# Patient Record
Sex: MALE | Race: WHITE | NOT HISPANIC OR LATINO
[De-identification: names, ages, dates, MRNs, and addresses within clinical notes are randomized per-mention and may not be internally consistent; named-entity substitution may affect disease eponyms.]

---

## 2019-05-16 PROBLEM — Z00.00 ENCOUNTER FOR PREVENTIVE HEALTH EXAMINATION: Status: ACTIVE | Noted: 2019-05-16

## 2019-05-29 ENCOUNTER — APPOINTMENT (OUTPATIENT)
Dept: UROLOGY | Facility: CLINIC | Age: 64
End: 2019-05-29
Payer: COMMERCIAL

## 2019-05-29 ENCOUNTER — TRANSCRIPTION ENCOUNTER (OUTPATIENT)
Age: 64
End: 2019-05-29

## 2019-05-29 VITALS
SYSTOLIC BLOOD PRESSURE: 141 MMHG | BODY MASS INDEX: 31.66 KG/M2 | HEART RATE: 90 BPM | DIASTOLIC BLOOD PRESSURE: 91 MMHG | WEIGHT: 197 LBS | HEIGHT: 66 IN

## 2019-05-29 PROCEDURE — 99204 OFFICE O/P NEW MOD 45 MIN: CPT

## 2019-05-29 NOTE — PHYSICAL EXAM
[General Appearance - Well Developed] : well developed [General Appearance - Well Nourished] : well nourished [Normal Appearance] : normal appearance [Well Groomed] : well groomed [General Appearance - In No Acute Distress] : no acute distress [Edema] : no peripheral edema [Respiration, Rhythm And Depth] : normal respiratory rhythm and effort [Exaggerated Use Of Accessory Muscles For Inspiration] : no accessory muscle use [Abdomen Soft] : soft [Abdomen Tenderness] : non-tender [Costovertebral Angle Tenderness] : no ~M costovertebral angle tenderness [Urethral Meatus] : meatus normal [Urinary Bladder Findings] : the bladder was normal on palpation [Scrotum] : the scrotum was normal [Testes Mass (___cm)] : there were no testicular masses [No Prostate Nodules] : no prostate nodules [Normal Station and Gait] : the gait and station were normal for the patient's age [] : no rash [No Focal Deficits] : no focal deficits [Mood] : the mood was normal [Oriented To Time, Place, And Person] : oriented to person, place, and time [Affect] : the affect was normal [No Palpable Adenopathy] : no palpable adenopathy [Not Anxious] : not anxious [FreeTextEntry1] : The prostate is small smooth and non concerning

## 2019-05-29 NOTE — ASSESSMENT
[FreeTextEntry1] : This is an initial visit to [facility] with a 64-year-old patient who has had some right-sided discomfort and this will blood and urine\par He has a history of staghorn calculus in his right kidney and has undergone ESWL procedures in the past\par He has no current urinary symptomatology\par I will get a uric acid level renew his allopurinol and because of his hematuria I reviewed his CAT scan which appears normal except for the large staghorn in his kidney stones about 2 years old\par I plan to get a cytology and fascia stones urine and if abnormal will follow up with a cystoscopy

## 2019-09-04 ENCOUNTER — APPOINTMENT (OUTPATIENT)
Dept: UROLOGY | Facility: CLINIC | Age: 64
End: 2019-09-04
Payer: COMMERCIAL

## 2019-09-04 VITALS
HEART RATE: 83 BPM | SYSTOLIC BLOOD PRESSURE: 143 MMHG | WEIGHT: 199 LBS | HEIGHT: 66 IN | BODY MASS INDEX: 31.98 KG/M2 | DIASTOLIC BLOOD PRESSURE: 86 MMHG

## 2019-09-04 DIAGNOSIS — F17.290 NICOTINE DEPENDENCE, OTHER TOBACCO PRODUCT, UNCOMPLICATED: ICD-10-CM

## 2019-09-04 PROCEDURE — 99213 OFFICE O/P EST LOW 20 MIN: CPT

## 2019-09-04 NOTE — ASSESSMENT
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

## 2019-09-04 NOTE — PHYSICAL EXAM
[General Appearance - Well Developed] : well developed [General Appearance - Well Nourished] : well nourished [Normal Appearance] : normal appearance [Well Groomed] : well groomed [General Appearance - In No Acute Distress] : no acute distress [Abdomen Soft] : soft [Abdomen Tenderness] : non-tender [Urethral Meatus] : meatus normal [Costovertebral Angle Tenderness] : no ~M costovertebral angle tenderness [Urinary Bladder Findings] : the bladder was normal on palpation [Testes Mass (___cm)] : there were no testicular masses [Scrotum] : the scrotum was normal [No Prostate Nodules] : no prostate nodules [Edema] : no peripheral edema [] : no respiratory distress [Respiration, Rhythm And Depth] : normal respiratory rhythm and effort [Exaggerated Use Of Accessory Muscles For Inspiration] : no accessory muscle use [Oriented To Time, Place, And Person] : oriented to person, place, and time [Affect] : the affect was normal [Mood] : the mood was normal [Not Anxious] : not anxious [Normal Station and Gait] : the gait and station were normal for the patient's age [No Focal Deficits] : no focal deficits [No Palpable Adenopathy] : no palpable adenopathy

## 2020-05-27 ENCOUNTER — RX RENEWAL (OUTPATIENT)
Age: 65
End: 2020-05-27

## 2020-09-10 ENCOUNTER — RX RENEWAL (OUTPATIENT)
Age: 65
End: 2020-09-10

## 2021-03-05 ENCOUNTER — RX RENEWAL (OUTPATIENT)
Age: 66
End: 2021-03-05

## 2021-04-02 ENCOUNTER — RX RENEWAL (OUTPATIENT)
Age: 66
End: 2021-04-02

## 2021-04-12 DIAGNOSIS — R82.998 OTHER ABNORMAL FINDINGS IN URINE: ICD-10-CM

## 2021-04-12 DIAGNOSIS — Z83.3 FAMILY HISTORY OF DIABETES MELLITUS: ICD-10-CM

## 2021-04-12 DIAGNOSIS — Z83.438 FAMILY HISTORY OF OTHER DISORDER OF LIPOPROTEIN METABOLISM AND OTHER LIPIDEMIA: ICD-10-CM

## 2021-04-12 DIAGNOSIS — Z80.9 FAMILY HISTORY OF MALIGNANT NEOPLASM, UNSPECIFIED: ICD-10-CM

## 2021-04-12 DIAGNOSIS — R10.30 LOWER ABDOMINAL PAIN, UNSPECIFIED: ICD-10-CM

## 2021-04-12 DIAGNOSIS — Z82.49 FAMILY HISTORY OF ISCHEMIC HEART DISEASE AND OTHER DISEASES OF THE CIRCULATORY SYSTEM: ICD-10-CM

## 2021-04-14 ENCOUNTER — APPOINTMENT (OUTPATIENT)
Dept: UROLOGY | Facility: CLINIC | Age: 66
End: 2021-04-14
Payer: COMMERCIAL

## 2021-04-14 VITALS
WEIGHT: 195 LBS | HEART RATE: 83 BPM | TEMPERATURE: 98 F | BODY MASS INDEX: 31.34 KG/M2 | HEIGHT: 66 IN | SYSTOLIC BLOOD PRESSURE: 165 MMHG | DIASTOLIC BLOOD PRESSURE: 92 MMHG

## 2021-04-14 PROCEDURE — 99214 OFFICE O/P EST MOD 30 MIN: CPT

## 2021-04-14 PROCEDURE — 51798 US URINE CAPACITY MEASURE: CPT

## 2021-04-14 PROCEDURE — 36415 COLL VENOUS BLD VENIPUNCTURE: CPT

## 2021-04-14 PROCEDURE — 99072 ADDL SUPL MATRL&STAF TM PHE: CPT

## 2021-04-14 PROCEDURE — 76775 US EXAM ABDO BACK WALL LIM: CPT

## 2021-04-14 RX ORDER — PRAVASTATIN SODIUM 10 MG/1
10 TABLET ORAL
Refills: 0 | Status: ACTIVE | COMMUNITY

## 2021-04-14 RX ORDER — CHROMIUM 200 MCG
TABLET ORAL
Refills: 0 | Status: ACTIVE | COMMUNITY

## 2021-04-14 RX ORDER — ALLOPURINOL 300 MG/1
300 TABLET ORAL
Qty: 90 | Refills: 3 | Status: DISCONTINUED | COMMUNITY
Start: 2019-05-29 | End: 2021-04-14

## 2021-04-14 RX ORDER — OMEGA-3/DHA/EPA/FISH OIL 300-1000MG
CAPSULE ORAL
Refills: 0 | Status: ACTIVE | COMMUNITY

## 2021-04-15 LAB
ALBUMIN SERPL ELPH-MCNC: 4.8 G/DL
ALP BLD-CCNC: 57 U/L
ALT SERPL-CCNC: 22 U/L
ANION GAP SERPL CALC-SCNC: 13 MMOL/L
APPEARANCE: CLEAR
AST SERPL-CCNC: 22 U/L
BACTERIA: ABNORMAL
BILIRUB SERPL-MCNC: 0.3 MG/DL
BILIRUBIN URINE: NEGATIVE
BLOOD URINE: NORMAL
BUN SERPL-MCNC: 15 MG/DL
CALCIUM SERPL-MCNC: 9.7 MG/DL
CHLORIDE SERPL-SCNC: 104 MMOL/L
CO2 SERPL-SCNC: 24 MMOL/L
COLOR: YELLOW
CREAT SERPL-MCNC: 0.77 MG/DL
GLUCOSE QUALITATIVE U: ABNORMAL
GLUCOSE SERPL-MCNC: 132 MG/DL
HYALINE CASTS: 1 /LPF
KETONES URINE: NEGATIVE
LEUKOCYTE ESTERASE URINE: ABNORMAL
MICROSCOPIC-UA: NORMAL
NITRITE URINE: NEGATIVE
PH URINE: 6
POTASSIUM SERPL-SCNC: 4.6 MMOL/L
PROT SERPL-MCNC: 6.9 G/DL
PROTEIN URINE: NORMAL
PSA SERPL-MCNC: 1.47 NG/ML
RED BLOOD CELLS URINE: 5 /HPF
SODIUM SERPL-SCNC: 141 MMOL/L
SPECIFIC GRAVITY URINE: 1.02
SQUAMOUS EPITHELIAL CELLS: 0 /HPF
URATE SERPL-MCNC: 3.4 MG/DL
UROBILINOGEN URINE: NORMAL
WHITE BLOOD CELLS URINE: 110 /HPF

## 2021-05-10 ENCOUNTER — RX RENEWAL (OUTPATIENT)
Age: 66
End: 2021-05-10

## 2021-05-26 ENCOUNTER — APPOINTMENT (OUTPATIENT)
Dept: UROLOGY | Facility: CLINIC | Age: 66
End: 2021-05-26
Payer: COMMERCIAL

## 2021-05-26 VITALS
BODY MASS INDEX: 31.34 KG/M2 | DIASTOLIC BLOOD PRESSURE: 89 MMHG | HEIGHT: 66 IN | SYSTOLIC BLOOD PRESSURE: 153 MMHG | WEIGHT: 195 LBS | TEMPERATURE: 98 F | HEART RATE: 102 BPM

## 2021-05-26 PROCEDURE — 36415 COLL VENOUS BLD VENIPUNCTURE: CPT

## 2021-05-26 PROCEDURE — 99214 OFFICE O/P EST MOD 30 MIN: CPT

## 2021-05-26 PROCEDURE — 99072 ADDL SUPL MATRL&STAF TM PHE: CPT

## 2021-05-26 PROCEDURE — 51798 US URINE CAPACITY MEASURE: CPT

## 2021-05-26 NOTE — ADDENDUM
[FreeTextEntry1] : This is a followup visit for this 65-year-old patient who has BPH and incomplete emptying he is placed on Flomax and feels he is doing much better\par His post void residual urine his last visit was over 600 cc and today is only to 40\par He does have increased frequency or term attributing to his yet to be diagnosed diabetes\par Every time I see him he spells have a glucose urine\par On Flomax he is doing much better but has retrograde ejaculation and told him this is normal\par I was able to find a stone analysis from his prior procedure and the stone was 80% uric acid and 20% calcium\par Today I drew an A1c in anticipation of the seeing his primary care physician\par I discussed the medical carpus and the patient who is a diabetic including erectile dysfunction neurogenic bladder etc

## 2021-05-26 NOTE — PHYSICAL EXAM
[General Appearance - Well Developed] : well developed [General Appearance - Well Nourished] : well nourished [Normal Appearance] : normal appearance [Well Groomed] : well groomed [General Appearance - In No Acute Distress] : no acute distress [Abdomen Soft] : soft [Abdomen Tenderness] : non-tender [Costovertebral Angle Tenderness] : no ~M costovertebral angle tenderness [Urethral Meatus] : meatus normal [Urinary Bladder Findings] : the bladder was normal on palpation [Scrotum] : the scrotum was normal [Testes Mass (___cm)] : there were no testicular masses [No Prostate Nodules] : no prostate nodules [Edema] : no peripheral edema [] : no respiratory distress [Respiration, Rhythm And Depth] : normal respiratory rhythm and effort [Exaggerated Use Of Accessory Muscles For Inspiration] : no accessory muscle use [Oriented To Time, Place, And Person] : oriented to person, place, and time [Affect] : the affect was normal [Not Anxious] : not anxious [Mood] : the mood was normal [Normal Station and Gait] : the gait and station were normal for the patient's age [No Focal Deficits] : no focal deficits [No Palpable Adenopathy] : no palpable adenopathy [FreeTextEntry1] : The bladder is palpable to the umbilicus there is no CVA tenderness the prostate is small soft and nontender on JOSEPH

## 2021-05-26 NOTE — PHYSICAL EXAM
[General Appearance - Well Developed] : well developed [General Appearance - Well Nourished] : well nourished [Normal Appearance] : normal appearance [Well Groomed] : well groomed [General Appearance - In No Acute Distress] : no acute distress [Abdomen Soft] : soft [Abdomen Tenderness] : non-tender [Costovertebral Angle Tenderness] : no ~M costovertebral angle tenderness [Urethral Meatus] : meatus normal [Urinary Bladder Findings] : the bladder was normal on palpation [Scrotum] : the scrotum was normal [Testes Mass (___cm)] : there were no testicular masses [No Prostate Nodules] : no prostate nodules [FreeTextEntry1] : Prostate feels benignly enlarged [Edema] : no peripheral edema [] : no respiratory distress [Respiration, Rhythm And Depth] : normal respiratory rhythm and effort [Exaggerated Use Of Accessory Muscles For Inspiration] : no accessory muscle use [Oriented To Time, Place, And Person] : oriented to person, place, and time [Affect] : the affect was normal [Mood] : the mood was normal [Not Anxious] : not anxious [Normal Station and Gait] : the gait and station were normal for the patient's age [No Focal Deficits] : no focal deficits [No Palpable Adenopathy] : no palpable adenopathy

## 2021-05-26 NOTE — ASSESSMENT
[FreeTextEntry1] : Scissor routine followup for the 65-year-old patient with history of uric acid calculi and a staghorn in his right kidney is here for removal of his allopurinol\par On review PSAs have been fine\par Residual rectal exam is noncontributory\par Because the patient noted some slowing down of his stream a bladder ultrasound was done showing a post void over 600 cc\par Images of the kidney showed no hydronephrosis either side the large stone in the right kidney with a 4.54 cm cyst of the lower pole of the right kidney\par No hydronephrosis seen in the kidney with a staghorn stone of the lower pole of kidney is suspected\par Surprisingly the patient is spilling heavy glucose. But has no history of diabetes\par I will send for his old records to make certain that the staghorn is Made of uric acid because if it is chemolysis With dissolution of the stone is a good possibility\par The patient Was asked tor double void and He voided an additional 125 mL

## 2021-05-26 NOTE — ASSESSMENT
[FreeTextEntry1] : This is a followup visit for this 65-year-old patient who has BPH and incomplete emptying he is placed on Flomax and feels he is doing much better\par His post void residual urine his last visit was over 600 cc and today is only 240\par He does have increased frequency which i am  attributing to his yet to be diagnosed diabetes\par Every time I see him he spills a lot of  glucose in his urine\par On Flomax he is doing much better but has retrograde ejaculation and told him this is normal\par I was able to find a stone analysis from his prior procedure and the stone was 80% uric acid and 20% calcium\par Today I drew an A1c in anticipation of the seeing his primary care physician\par I discussed the medical consequences in a  patient who is a diabetic including erectile dysfunction neurogenic bladder etc\par I advised him once again to seek medical and endocrine consults in regard to his diabetes and possible chemolysis for his staghorn calculus

## 2021-05-26 NOTE — ADDENDUM
[FreeTextEntry1] : PVR\par A transabdominal ultrasound was performed images the bladder were obtained in the transverse and longitudinal post void residual was surprisingly over 600 cc the patient was asked to void again\par Bilateral renal ultrasound was done a 4.5 cm distal lower pole right kidney was seen multiple stones within the right kidney were noted compatible with a staghorn calculus\par The left kidney was normal in size and contour there is a suspicion of an acoustic shadowing stone at the lower pole collecting

## 2021-05-27 ENCOUNTER — NON-APPOINTMENT (OUTPATIENT)
Age: 66
End: 2021-05-27

## 2021-05-27 LAB
ALBUMIN SERPL ELPH-MCNC: 4.6 G/DL
ALP BLD-CCNC: 59 U/L
ALT SERPL-CCNC: 18 U/L
ANION GAP SERPL CALC-SCNC: 13 MMOL/L
APPEARANCE: ABNORMAL
AST SERPL-CCNC: 20 U/L
BACTERIA: ABNORMAL
BILIRUB SERPL-MCNC: 0.5 MG/DL
BILIRUBIN URINE: NEGATIVE
BLOOD URINE: ABNORMAL
BUN SERPL-MCNC: 15 MG/DL
CALCIUM SERPL-MCNC: 9.5 MG/DL
CHLORIDE SERPL-SCNC: 105 MMOL/L
CO2 SERPL-SCNC: 22 MMOL/L
COLOR: YELLOW
CREAT SERPL-MCNC: 0.75 MG/DL
ESTIMATED AVERAGE GLUCOSE: 143 MG/DL
GLUCOSE QUALITATIVE U: ABNORMAL
GLUCOSE SERPL-MCNC: 175 MG/DL
HBA1C MFR BLD HPLC: 6.6 %
HYALINE CASTS: 1 /LPF
KETONES URINE: NEGATIVE
LEUKOCYTE ESTERASE URINE: ABNORMAL
MICROSCOPIC-UA: NORMAL
NITRITE URINE: NEGATIVE
PH URINE: 5.5
POTASSIUM SERPL-SCNC: 4.6 MMOL/L
PROT SERPL-MCNC: 6.9 G/DL
PROTEIN URINE: ABNORMAL
RED BLOOD CELLS URINE: 45 /HPF
SODIUM SERPL-SCNC: 140 MMOL/L
SPECIFIC GRAVITY URINE: 1.02
SQUAMOUS EPITHELIAL CELLS: 0 /HPF
URATE SERPL-MCNC: 3.6 MG/DL
UROBILINOGEN URINE: NORMAL
WHITE BLOOD CELLS URINE: 176 /HPF

## 2021-06-07 ENCOUNTER — RX RENEWAL (OUTPATIENT)
Age: 66
End: 2021-06-07

## 2021-07-06 ENCOUNTER — RX RENEWAL (OUTPATIENT)
Age: 66
End: 2021-07-06

## 2022-05-31 ENCOUNTER — RX RENEWAL (OUTPATIENT)
Age: 67
End: 2022-05-31

## 2022-07-08 ENCOUNTER — RX RENEWAL (OUTPATIENT)
Age: 67
End: 2022-07-08

## 2022-07-11 ENCOUNTER — RX RENEWAL (OUTPATIENT)
Age: 67
End: 2022-07-11

## 2022-07-21 ENCOUNTER — APPOINTMENT (OUTPATIENT)
Dept: UROLOGY | Facility: CLINIC | Age: 67
End: 2022-07-21

## 2022-07-21 VITALS
HEART RATE: 82 BPM | HEIGHT: 65 IN | WEIGHT: 196 LBS | SYSTOLIC BLOOD PRESSURE: 152 MMHG | BODY MASS INDEX: 32.65 KG/M2 | DIASTOLIC BLOOD PRESSURE: 95 MMHG

## 2022-07-21 DIAGNOSIS — R81 GLYCOSURIA: ICD-10-CM

## 2022-07-21 PROCEDURE — 76857 US EXAM PELVIC LIMITED: CPT

## 2022-07-21 PROCEDURE — 81000 URINALYSIS NONAUTO W/SCOPE: CPT

## 2022-07-21 PROCEDURE — 99215 OFFICE O/P EST HI 40 MIN: CPT

## 2022-07-22 ENCOUNTER — NON-APPOINTMENT (OUTPATIENT)
Age: 67
End: 2022-07-22

## 2022-07-22 LAB
APPEARANCE: ABNORMAL
BACTERIA: ABNORMAL
BILIRUBIN URINE: NEGATIVE
BLOOD URINE: ABNORMAL
COLOR: YELLOW
GLUCOSE QUALITATIVE U: ABNORMAL
HYALINE CASTS: 0 /LPF
KETONES URINE: NORMAL
LEUKOCYTE ESTERASE URINE: ABNORMAL
MICROSCOPIC-UA: NORMAL
NITRITE URINE: NEGATIVE
PH URINE: 5.5
PROTEIN URINE: ABNORMAL
PSA SERPL-MCNC: 1.08 NG/ML
RED BLOOD CELLS URINE: 54 /HPF
SPECIFIC GRAVITY URINE: 1.02
SQUAMOUS EPITHELIAL CELLS: 0 /HPF
URINE CYTOLOGY: NORMAL
UROBILINOGEN URINE: NORMAL
WHITE BLOOD CELLS URINE: 323 /HPF

## 2022-07-29 NOTE — ASSESSMENT
[FreeTextEntry1] : Patient is a 67 year male who presents for annual follow up.  He states his urgency and frequency has increased slightly over the last year and force of stream has decreased.  He did not go to PCP for evaluation of diabetes as instructed by Dr. Ramos last year and continues to spill sugar in his urine.   ml\par JOSEPH was normal \par UA shows blood, LE and glucose\par PSA 1.47 in 4/21\par \par A/P\par 1. glucosuria \par I again recommend in urgent terms the need to go to his PCP to get evaluated for diabetes.\par I told him that his elevated PVR may be a result of loss of bladder function due to diabetes.\par 2. hematuria\par 3. prostate cancer screening \par 4. BPH with obstruction\par CT Scan abd and pelvis , UA micro and cyotology \par Flomax 0.4 mg po qd\par >50 minutes spent in review of data and discussion with patient\par office in 1 month

## 2022-07-29 NOTE — PHYSICAL EXAM
[General Appearance - Well Developed] : well developed [Normal Appearance] : normal appearance [General Appearance - In No Acute Distress] : no acute distress [Abdomen Soft] : soft [Abdomen Hernia] : no hernia was discovered [Urethral Meatus] : meatus normal [Penis Abnormality] : normal circumcised penis [Scrotum] : the scrotum was normal [Epididymis] : the epididymides were normal [Testes Tenderness] : no tenderness of the testes [Testes Mass (___cm)] : there were no testicular masses [Anus Abnormality] : the anus and perineum were normal [Rectal Exam - Rectum] : digital rectal exam was normal [Prostate Tenderness] : the prostate was not tender [No Prostate Nodules] : no prostate nodules [Prostate Size ___ gm] : prostate size [unfilled] gm [Skin Color & Pigmentation] : normal skin color and pigmentation [Edema] : no peripheral edema [] : no respiratory distress [Oriented To Time, Place, And Person] : oriented to person, place, and time [Normal Station and Gait] : the gait and station were normal for the patient's age [Inguinal Lymph Nodes Enlarged Bilaterally] : inguinal

## 2022-08-05 ENCOUNTER — RX CHANGE (OUTPATIENT)
Age: 67
End: 2022-08-05

## 2022-08-09 ENCOUNTER — RX RENEWAL (OUTPATIENT)
Age: 67
End: 2022-08-09

## 2022-08-15 ENCOUNTER — APPOINTMENT (OUTPATIENT)
Dept: UROLOGY | Facility: CLINIC | Age: 67
End: 2022-08-15

## 2022-08-15 VITALS
BODY MASS INDEX: 32.95 KG/M2 | TEMPERATURE: 98 F | HEART RATE: 78 BPM | DIASTOLIC BLOOD PRESSURE: 87 MMHG | WEIGHT: 198 LBS | SYSTOLIC BLOOD PRESSURE: 142 MMHG

## 2022-08-15 PROCEDURE — 99213 OFFICE O/P EST LOW 20 MIN: CPT

## 2022-08-16 NOTE — ASSESSMENT
[FreeTextEntry1] : Patient is a 67 year male with hematuria and recurrent UTIs from large right staghorn renal calculus.  Repeat CT scan shows stone relatively stable/slightly larger.  No interval UTIs.  Also BPH on  Tamsulosin.\par Seeing primary doctor for DM.\par \par A/P\par 1. right staghorn renal calculus\par 2. DM\par 3. BPH\par discussed CT findings and discuss PCNL\par he has been considering procedure for years \par he will consider office 4 months \par

## 2022-08-16 NOTE — PHYSICAL EXAM
[General Appearance - Well Developed] : well developed [Normal Appearance] : normal appearance [Abdomen Soft] : soft [Costovertebral Angle Tenderness] : no ~M costovertebral angle tenderness [Skin Color & Pigmentation] : normal skin color and pigmentation [Edema] : no peripheral edema [] : no respiratory distress [Oriented To Time, Place, And Person] : oriented to person, place, and time [Normal Station and Gait] : the gait and station were normal for the patient's age

## 2023-08-04 ENCOUNTER — RX RENEWAL (OUTPATIENT)
Age: 68
End: 2023-08-04

## 2023-09-25 ENCOUNTER — RX RENEWAL (OUTPATIENT)
Age: 68
End: 2023-09-25

## 2023-09-27 ENCOUNTER — NON-APPOINTMENT (OUTPATIENT)
Age: 68
End: 2023-09-27

## 2023-10-30 ENCOUNTER — APPOINTMENT (OUTPATIENT)
Dept: UROLOGY | Facility: CLINIC | Age: 68
End: 2023-10-30
Payer: COMMERCIAL

## 2023-10-30 VITALS
DIASTOLIC BLOOD PRESSURE: 91 MMHG | HEART RATE: 91 BPM | WEIGHT: 198 LBS | HEIGHT: 65 IN | BODY MASS INDEX: 32.99 KG/M2 | SYSTOLIC BLOOD PRESSURE: 133 MMHG

## 2023-10-30 DIAGNOSIS — Z87.442 PERSONAL HISTORY OF URINARY CALCULI: ICD-10-CM

## 2023-10-30 DIAGNOSIS — R31.29 OTHER MICROSCOPIC HEMATURIA: ICD-10-CM

## 2023-10-30 PROCEDURE — 99214 OFFICE O/P EST MOD 30 MIN: CPT

## 2023-10-31 LAB — PSA SERPL-MCNC: 1.33 NG/ML

## 2023-11-03 RX ORDER — ALLOPURINOL 300 MG/1
300 TABLET ORAL
Qty: 90 | Refills: 3 | Status: ACTIVE | COMMUNITY
Start: 2021-04-14 | End: 1900-01-01

## 2023-11-30 ENCOUNTER — APPOINTMENT (OUTPATIENT)
Dept: UROLOGY | Facility: CLINIC | Age: 68
End: 2023-11-30

## 2023-12-20 ENCOUNTER — APPOINTMENT (OUTPATIENT)
Dept: UROLOGY | Facility: CLINIC | Age: 68
End: 2023-12-20
Payer: COMMERCIAL

## 2023-12-20 PROCEDURE — 99441: CPT | Mod: 93

## 2023-12-27 RX ORDER — TAMSULOSIN HYDROCHLORIDE 0.4 MG/1
0.4 CAPSULE ORAL
Qty: 90 | Refills: 3 | Status: ACTIVE | COMMUNITY
Start: 2021-04-14 | End: 1900-01-01

## 2024-03-12 ENCOUNTER — NON-APPOINTMENT (OUTPATIENT)
Age: 69
End: 2024-03-12

## 2024-03-20 ENCOUNTER — APPOINTMENT (OUTPATIENT)
Dept: PEDIATRIC ORTHOPEDIC SURGERY | Facility: CLINIC | Age: 69
End: 2024-03-20
Payer: COMMERCIAL

## 2024-03-20 VITALS — TEMPERATURE: 97.1 F | DIASTOLIC BLOOD PRESSURE: 85 MMHG | SYSTOLIC BLOOD PRESSURE: 144 MMHG

## 2024-03-20 VITALS — DIASTOLIC BLOOD PRESSURE: 85 MMHG | TEMPERATURE: 97 F | SYSTOLIC BLOOD PRESSURE: 167 MMHG

## 2024-03-20 PROCEDURE — 20610 DRAIN/INJ JOINT/BURSA W/O US: CPT | Mod: LT

## 2024-03-20 PROCEDURE — 73562 X-RAY EXAM OF KNEE 3: CPT | Mod: LT

## 2024-03-20 PROCEDURE — 99203 OFFICE O/P NEW LOW 30 MIN: CPT | Mod: 25

## 2024-03-20 RX ORDER — MELOXICAM 15 MG/1
15 TABLET ORAL
Qty: 30 | Refills: 1 | Status: ACTIVE | COMMUNITY
Start: 2024-03-20 | End: 1900-01-01

## 2024-03-20 NOTE — HISTORY OF PRESENT ILLNESS
[de-identified] : This 68-year-old healthy gentleman is seen for evaluation of his left knee.  He was well until approximately 10 days ago when he was getting into his car quickly and he struck the anterior aspect of his knee against the dashboard.  He was precipitated pain with minimal swelling and stiffness.  He did improve however 1 evening he played pickle ball and the following day he woke up with severe pain and soft tissue swelling to his knee.  This was associated with mild stiffness and difficulty bearing weight.  No locking buckling or sensation of instability.  Prior to this he had no history of knee complaint.  He has a past history of kidney stones for which she is on allopurinol.  He has never had a history of gouty attack

## 2024-03-20 NOTE — PHYSICAL EXAM
[de-identified] : Exam today reveals a minimally antalgic component to his gait on the left side he has full motion to the left hip and knee the knee slight restriction of full flexion of maybe 5 degrees as compared to the opposite side.  He has a moderate effusion there is no evidence of instability on stress of the cruciate/collateral ligaments.  He has obvious pain along the posterior medial joint line and over the femoral origin of the MCL currently does not again open up distress.  Mildly positive Steinmann.  No significant pain on patellofemoral grind and in the lateral compartment.  Popliteal fossa calf neuro vas exam are negative.  X-rays ordered and taken today standing to include a Hinson view of the left knee reveal no significant abnormalities

## 2024-03-31 ENCOUNTER — NON-APPOINTMENT (OUTPATIENT)
Age: 69
End: 2024-03-31

## 2024-04-02 ENCOUNTER — APPOINTMENT (OUTPATIENT)
Dept: UROLOGY | Facility: CLINIC | Age: 69
End: 2024-04-02
Payer: COMMERCIAL

## 2024-04-02 VITALS
WEIGHT: 186 LBS | SYSTOLIC BLOOD PRESSURE: 146 MMHG | DIASTOLIC BLOOD PRESSURE: 89 MMHG | HEART RATE: 104 BPM | BODY MASS INDEX: 30.99 KG/M2 | HEIGHT: 65 IN

## 2024-04-02 DIAGNOSIS — N40.0 BENIGN PROSTATIC HYPERPLASIA WITHOUT LOWER URINARY TRACT SYMPMS: ICD-10-CM

## 2024-04-02 DIAGNOSIS — N20.0 CALCULUS OF KIDNEY: ICD-10-CM

## 2024-04-02 PROCEDURE — 99214 OFFICE O/P EST MOD 30 MIN: CPT

## 2024-04-02 RX ORDER — SULFAMETHOXAZOLE AND TRIMETHOPRIM 800; 160 MG/1; MG/1
TABLET ORAL
Refills: 0 | Status: ACTIVE | COMMUNITY

## 2024-04-02 NOTE — ASSESSMENT
[FreeTextEntry1] : Patient is a 68-year-old male who presents to me with gross hematuria over the last 2 days.  He is increased his fluid intake and is starting to lighten up.  He went to urgent care and they started him on Bactrim 1 p.o. twice daily for days.  He denies any fevers, chills, nausea or vomiting.  He did have a CT scan in November 2023 that showed stable right staghorn with some additional stones and mild right hydronephrosis which is stable for the patient.  I have been discussing with him the need to treat this staghorn renal calculus over the past several years but he does wish to observe.  In addition, he has BPH with obstruction and continues on tamsulosin 0.4 mg p.o. daily with good results.  Assessment and plan 1.  Staghorn right renal calculus likely contributing to hematuria may be causing chronic inflammation of the right renal pelvis.  I discussed the my concerns that this could be resulting in worsening renal function or hydronephrosis. 2.  Gross hematuria 3.  BPH with obstruction on tamsulosin we will continue I will send his urine for cytology and get him set up for office cystoscopy.  Since he has had a recent CT scan in November 23 will hold off on imaging for right now.  I will see if his hematuria clears and see what his cytology shows.

## 2024-04-02 NOTE — PHYSICAL EXAM
[General Appearance - Well Developed] : well developed [General Appearance - In No Acute Distress] : no acute distress [Normal Appearance] : normal appearance [Respiration, Rhythm And Depth] : normal respiratory rhythm and effort [] : no rash [Abdomen Soft] : soft [Costovertebral Angle Tenderness] : no ~M costovertebral angle tenderness [Oriented To Time, Place, And Person] : oriented to person, place, and time

## 2024-04-04 LAB — URINE CYTOLOGY: NORMAL

## 2024-04-09 ENCOUNTER — APPOINTMENT (OUTPATIENT)
Dept: UROLOGY | Facility: CLINIC | Age: 69
End: 2024-04-09
Payer: COMMERCIAL

## 2024-04-09 VITALS
HEART RATE: 87 BPM | HEIGHT: 65 IN | BODY MASS INDEX: 30.99 KG/M2 | SYSTOLIC BLOOD PRESSURE: 135 MMHG | DIASTOLIC BLOOD PRESSURE: 84 MMHG | WEIGHT: 186 LBS

## 2024-04-09 PROCEDURE — 52000 CYSTOURETHROSCOPY: CPT

## 2024-04-09 RX ORDER — AMPICILLIN 500 MG/1
500 CAPSULE ORAL 4 TIMES DAILY
Qty: 24 | Refills: 0 | Status: DISCONTINUED | COMMUNITY
Start: 2021-06-01 | End: 2024-04-09

## 2024-05-09 ENCOUNTER — APPOINTMENT (OUTPATIENT)
Dept: PEDIATRIC ORTHOPEDIC SURGERY | Facility: CLINIC | Age: 69
End: 2024-05-09
Payer: COMMERCIAL

## 2024-05-09 VITALS
TEMPERATURE: 96.5 F | BODY MASS INDEX: 30.99 KG/M2 | WEIGHT: 186 LBS | DIASTOLIC BLOOD PRESSURE: 80 MMHG | HEIGHT: 65 IN | SYSTOLIC BLOOD PRESSURE: 130 MMHG

## 2024-05-09 DIAGNOSIS — M23.8X2 OTHER INTERNAL DERANGEMENTS OF LEFT KNEE: ICD-10-CM

## 2024-05-09 PROCEDURE — 99212 OFFICE O/P EST SF 10 MIN: CPT

## 2024-05-09 RX ORDER — DICLOFENAC SODIUM 75 MG/1
75 TABLET, DELAYED RELEASE ORAL TWICE DAILY
Qty: 60 | Refills: 1 | Status: ACTIVE | COMMUNITY
Start: 2024-05-09 | End: 1900-01-01

## 2024-05-09 NOTE — ASSESSMENT
[FreeTextEntry1] : Impression: Internal derangement left knee likely tear medial meniscus.  I have changed his medication from Mobic to diclofenac with GI precautions physical therapy prescription again has been provided.  I have also ordered an MRI exam the potential for arthroscopic surgery has been discussed

## 2024-05-09 NOTE — PHYSICAL EXAM
[de-identified] : Exam reveals an obvious antalgic gait on the left side he has moderate effusion no instability has obvious pain in the medial compartment posterior medial joint line with a positive Steinmann/Crissy.  Popliteal fossa calf neuro vas exam are negative

## 2024-05-09 NOTE — HISTORY OF PRESENT ILLNESS
[de-identified] : This 68-year-old returns he has not had any improvement whatsoever with regards to his left knee.  He has persistent limp and stiffness and difficulty with motion.  He feels as if his knee will give out on him.

## 2024-06-01 ENCOUNTER — RESULT REVIEW (OUTPATIENT)
Age: 69
End: 2024-06-01

## 2024-06-25 ENCOUNTER — APPOINTMENT (OUTPATIENT)
Dept: UROLOGY | Facility: CLINIC | Age: 69
End: 2024-06-25

## 2024-06-25 VITALS
SYSTOLIC BLOOD PRESSURE: 132 MMHG | HEART RATE: 94 BPM | DIASTOLIC BLOOD PRESSURE: 81 MMHG | BODY MASS INDEX: 30.99 KG/M2 | WEIGHT: 186 LBS | HEIGHT: 65 IN

## 2024-06-27 LAB — BACTERIA UR CULT: NORMAL

## 2024-06-28 ENCOUNTER — APPOINTMENT (OUTPATIENT)
Dept: UROLOGY | Facility: CLINIC | Age: 69
End: 2024-06-28

## 2024-06-28 ENCOUNTER — APPOINTMENT (OUTPATIENT)
Dept: UROLOGY | Facility: CLINIC | Age: 69
End: 2024-06-28
Payer: COMMERCIAL

## 2024-06-28 VITALS
BODY MASS INDEX: 30.99 KG/M2 | DIASTOLIC BLOOD PRESSURE: 76 MMHG | HEART RATE: 97 BPM | SYSTOLIC BLOOD PRESSURE: 145 MMHG | HEIGHT: 65 IN | WEIGHT: 186 LBS

## 2024-06-28 DIAGNOSIS — N30.20 OTHER CHRONIC CYSTITIS W/OUT HEMATURIA: ICD-10-CM

## 2024-06-28 DIAGNOSIS — N20.0 CALCULUS OF KIDNEY: ICD-10-CM

## 2024-06-28 DIAGNOSIS — N39.0 URINARY TRACT INFECTION, SITE NOT SPECIFIED: ICD-10-CM

## 2024-06-28 DIAGNOSIS — R31.9 HEMATURIA, UNSPECIFIED: ICD-10-CM

## 2024-06-28 PROCEDURE — 99214 OFFICE O/P EST MOD 30 MIN: CPT

## 2024-07-01 DIAGNOSIS — R97.20 ELEVATED PROSTATE, SPECIFIC ANTIGEN [PSA]: ICD-10-CM

## 2024-07-01 LAB — PSA SERPL-MCNC: 2.23 NG/ML

## 2024-07-03 ENCOUNTER — NON-APPOINTMENT (OUTPATIENT)
Age: 69
End: 2024-07-03

## 2024-07-08 ENCOUNTER — APPOINTMENT (OUTPATIENT)
Dept: UROLOGY | Facility: CLINIC | Age: 69
End: 2024-07-08
Payer: COMMERCIAL

## 2024-07-08 VITALS
HEIGHT: 65 IN | BODY MASS INDEX: 30.99 KG/M2 | HEART RATE: 108 BPM | SYSTOLIC BLOOD PRESSURE: 124 MMHG | DIASTOLIC BLOOD PRESSURE: 73 MMHG | WEIGHT: 186 LBS

## 2024-07-08 PROCEDURE — 99214 OFFICE O/P EST MOD 30 MIN: CPT

## 2024-07-08 RX ORDER — FINASTERIDE 5 MG/1
5 TABLET, FILM COATED ORAL DAILY
Qty: 90 | Refills: 3 | Status: ACTIVE | COMMUNITY
Start: 2024-07-08 | End: 1900-01-01

## 2024-07-10 ENCOUNTER — APPOINTMENT (OUTPATIENT)
Dept: UROLOGY | Facility: CLINIC | Age: 69
End: 2024-07-10
Payer: COMMERCIAL

## 2024-07-10 VITALS
HEIGHT: 65 IN | SYSTOLIC BLOOD PRESSURE: 108 MMHG | WEIGHT: 186 LBS | DIASTOLIC BLOOD PRESSURE: 68 MMHG | BODY MASS INDEX: 30.99 KG/M2 | HEART RATE: 106 BPM

## 2024-07-10 DIAGNOSIS — R31.9 HEMATURIA, UNSPECIFIED: ICD-10-CM

## 2024-07-10 PROCEDURE — 99213 OFFICE O/P EST LOW 20 MIN: CPT

## 2024-07-12 LAB
ALBUMIN SERPL ELPH-MCNC: 4.1 G/DL
ALP BLD-CCNC: 48 U/L
ALT SERPL-CCNC: 15 U/L
ANION GAP SERPL CALC-SCNC: 12 MMOL/L
AST SERPL-CCNC: 20 U/L
BILIRUB SERPL-MCNC: <0.2 MG/DL
BUN SERPL-MCNC: 18 MG/DL
CALCIUM SERPL-MCNC: 9.3 MG/DL
CHLORIDE SERPL-SCNC: 100 MMOL/L
CO2 SERPL-SCNC: 24 MMOL/L
CREAT SERPL-MCNC: 1.14 MG/DL
EGFR: 70 ML/MIN/1.73M2
GLUCOSE SERPL-MCNC: 226 MG/DL
HCT VFR BLD CALC: 29.4 %
HGB BLD-MCNC: 9.4 G/DL
MCHC RBC-ENTMCNC: 28.6 PG
MCHC RBC-ENTMCNC: 32 GM/DL
MCV RBC AUTO: 89.4 FL
PLATELET # BLD AUTO: 330 K/UL
POTASSIUM SERPL-SCNC: 4.8 MMOL/L
PROT SERPL-MCNC: 6.5 G/DL
RBC # BLD: 3.29 M/UL
RBC # FLD: 13.3 %
SODIUM SERPL-SCNC: 137 MMOL/L
WBC # FLD AUTO: 8.76 K/UL

## 2024-07-16 ENCOUNTER — TRANSCRIPTION ENCOUNTER (OUTPATIENT)
Age: 69
End: 2024-07-16

## 2024-07-19 ENCOUNTER — APPOINTMENT (OUTPATIENT)
Dept: UROLOGY | Facility: CLINIC | Age: 69
End: 2024-07-19

## 2024-07-19 VITALS
TEMPERATURE: 98.2 F | HEIGHT: 65 IN | BODY MASS INDEX: 30.82 KG/M2 | OXYGEN SATURATION: 99 % | HEART RATE: 90 BPM | DIASTOLIC BLOOD PRESSURE: 83 MMHG | SYSTOLIC BLOOD PRESSURE: 136 MMHG | WEIGHT: 185 LBS

## 2024-07-19 PROCEDURE — 99214 OFFICE O/P EST MOD 30 MIN: CPT

## 2024-07-22 LAB
APTT BLD: 31.2 SEC
INR PPP: 0.96 RATIO
PT BLD: 11 SEC

## 2024-07-23 ENCOUNTER — APPOINTMENT (OUTPATIENT)
Dept: UROLOGY | Facility: CLINIC | Age: 69
End: 2024-07-23
Payer: COMMERCIAL

## 2024-07-23 VITALS
HEIGHT: 65 IN | HEART RATE: 111 BPM | WEIGHT: 185 LBS | DIASTOLIC BLOOD PRESSURE: 71 MMHG | BODY MASS INDEX: 30.82 KG/M2 | SYSTOLIC BLOOD PRESSURE: 120 MMHG

## 2024-07-23 DIAGNOSIS — N40.0 BENIGN PROSTATIC HYPERPLASIA WITHOUT LOWER URINARY TRACT SYMPMS: ICD-10-CM

## 2024-07-23 DIAGNOSIS — N30.20 OTHER CHRONIC CYSTITIS W/OUT HEMATURIA: ICD-10-CM

## 2024-07-23 DIAGNOSIS — Z87.442 PERSONAL HISTORY OF URINARY CALCULI: ICD-10-CM

## 2024-07-23 DIAGNOSIS — N20.0 CALCULUS OF KIDNEY: ICD-10-CM

## 2024-07-23 DIAGNOSIS — N39.0 URINARY TRACT INFECTION, SITE NOT SPECIFIED: ICD-10-CM

## 2024-07-23 LAB
ANION GAP SERPL CALC-SCNC: 13 MMOL/L
BUN SERPL-MCNC: 23 MG/DL
CALCIUM SERPL-MCNC: 9.7 MG/DL
CHLORIDE SERPL-SCNC: 101 MMOL/L
CO2 SERPL-SCNC: 22 MMOL/L
CREAT SERPL-MCNC: 1.13 MG/DL
EGFR: 71 ML/MIN/1.73M2
GLUCOSE SERPL-MCNC: 158 MG/DL
HCT VFR BLD CALC: 37.3 %
HGB BLD-MCNC: 11.5 G/DL
MCHC RBC-ENTMCNC: 28.8 PG
MCHC RBC-ENTMCNC: 30.8 GM/DL
MCV RBC AUTO: 93.5 FL
PLATELET # BLD AUTO: 340 K/UL
POTASSIUM SERPL-SCNC: 5 MMOL/L
RBC # BLD: 3.99 M/UL
RBC # FLD: 14.4 %
SODIUM SERPL-SCNC: 136 MMOL/L
WBC # FLD AUTO: 7.82 K/UL

## 2024-07-23 PROCEDURE — 99213 OFFICE O/P EST LOW 20 MIN: CPT

## 2024-07-23 RX ORDER — CEFDINIR 300 MG/1
300 CAPSULE ORAL TWICE DAILY
Qty: 14 | Refills: 0 | Status: ACTIVE | COMMUNITY
Start: 2024-07-23 | End: 1900-01-01

## 2024-07-23 NOTE — ASSESSMENT
[FreeTextEntry1] : Patient is a 69-year-old male with intermittent gross hematuria, urinary retention, recent UTI and staghorn renal calculus. He is doing intermittent catheterization and voiding small amounts on his own. He states his urine is clearing up and he has had no interval fevers. He does feel fatigued with malodorous urine.  Assessment and plan 1. Staghorn right renal calculus likely contributing to hematuria may be causing chronic inflammation of the right renal pelvis. I discussed the my concerns again that this could be resulting in worsening renal function or hydronephrosis. He saw tertiary care specialist at [facility] to see for evaluation and treatment of his right staghorn and is scheduled for R PCNL in August. 2. Gross hematuria-resolved 3. BPH with obstruction/urinary retention currently on tamsulosin to 0.8 mg a day and finasteride 5 mg p.o. daily. Continue CIC and I will call in a week of Omnicef.  He will follow up with me after his surgery.

## 2024-07-23 NOTE — PHYSICAL EXAM
[General Appearance - Well Developed] : well developed [Normal Appearance] : normal appearance [Respiration, Rhythm And Depth] : normal respiratory rhythm and effort [] : no rash [Oriented To Time, Place, And Person] : oriented to person, place, and time [FreeTextEntry1] : appears fatigued

## 2024-08-13 LAB
APPEARANCE: ABNORMAL
BACTERIA: ABNORMAL /HPF
BILIRUBIN URINE: NEGATIVE
BLOOD URINE: ABNORMAL
CAST: 5 /LPF
COLOR: YELLOW
EPITHELIAL CELLS: 1 /HPF
GLUCOSE QUALITATIVE U: 500 MG/DL
KETONES URINE: NEGATIVE MG/DL
LEUKOCYTE ESTERASE URINE: ABNORMAL
MICROSCOPIC-UA: NORMAL
NITRITE URINE: POSITIVE
PH URINE: 5.5
PROTEIN URINE: 30 MG/DL
RED BLOOD CELLS URINE: 18 /HPF
REVIEW: NORMAL
SPECIFIC GRAVITY URINE: 1.02
UROBILINOGEN URINE: 0.2 MG/DL
WBC CLUMPS: PRESENT
WHITE BLOOD CELLS URINE: >998 /HPF

## 2024-08-13 RX ORDER — CEFDINIR 300 MG/1
300 CAPSULE ORAL TWICE DAILY
Qty: 20 | Refills: 0 | Status: ACTIVE | COMMUNITY
Start: 2024-08-13 | End: 1900-01-01

## 2024-08-15 ENCOUNTER — NON-APPOINTMENT (OUTPATIENT)
Age: 69
End: 2024-08-15

## 2024-08-16 VITALS
DIASTOLIC BLOOD PRESSURE: 82 MMHG | TEMPERATURE: 97 F | WEIGHT: 183.42 LBS | HEART RATE: 78 BPM | SYSTOLIC BLOOD PRESSURE: 144 MMHG | RESPIRATION RATE: 16 BRPM | HEIGHT: 65 IN | OXYGEN SATURATION: 100 %

## 2024-08-16 LAB — BACTERIA UR CULT: ABNORMAL

## 2024-08-16 NOTE — ASU PATIENT PROFILE, ADULT - NSICDXPASTMEDICALHX_GEN_ALL_CORE_FT
PAST MEDICAL HISTORY:  Calculus of kidney      PAST MEDICAL HISTORY:  Calculus of kidney     Recurrent UTI      PAST MEDICAL HISTORY:  Calculus of kidney     HLD (hyperlipidemia)     Recurrent UTI

## 2024-08-16 NOTE — ASU PREOP CHECKLIST - HEART RATE (BEATS/MIN)
[facility] Division of Hospital Medicine  Johny Guerra DO  Pager (M-F, 8A-5P): [phone]      Patient is a 58y old  Male who presents with a chief complaint of r/o COVID/hypoxia (22 Mar 2020 04:33)      SUBJECTIVE / OVERNIGHT EVENTS: Pt see in [facility]. Pt initially conversing with self and dameon, but did appropriately answer my question when prompted. Currently denies sob, reports being told of a fever, but does not feel warm and reports chronic le edema  ADDITIONAL REVIEW OF SYSTEMS: limited due to psych disorder     MEDICATIONS  (STANDING):  benztropine 0.5 milliGRAM(s) Oral two times a day  dextrose 5%. 1000 milliLiter(s) (50 mL/Hr) IV Continuous <Continuous>  dextrose 50% Injectable 12.5 Gram(s) IV Push once  dextrose 50% Injectable 25 Gram(s) IV Push once  dextrose 50% Injectable 25 Gram(s) IV Push once  diVALproex  milliGRAM(s) Oral two times a day  fluPHENAZine 5 milliGRAM(s) Oral two times a day  heparin  Injectable 5000 Unit(s) SubCutaneous every 12 hours  insulin lispro (HumaLOG) corrective regimen sliding scale   SubCutaneous three times a day before meals  insulin lispro (HumaLOG) corrective regimen sliding scale   SubCutaneous at bedtime  risperiDONE   Tablet 1 milliGRAM(s) Oral two times a day  tiotropium 18 MICROgram(s) Capsule 1 Capsule(s) Inhalation daily    MEDICATIONS  (PRN):  acetaminophen   Tablet .. 650 milliGRAM(s) Oral every 6 hours PRN Temp greater or equal to 38C (100.4F), Mild Pain (1 - 3), Moderate Pain (4 - 6), Severe Pain (7 - 10)  ALBUTerol    90 MICROgram(s) HFA Inhaler 2 Puff(s) Inhalation every 6 hours PRN Shortness of Breath and/or Wheezing  dextrose 40% Gel 15 Gram(s) Oral once PRN Blood Glucose LESS THAN 70 milliGRAM(s)/deciliter  glucagon  Injectable 1 milliGRAM(s) IntraMuscular once PRN Glucose LESS THAN 70 milligrams/deciliter      CAPILLARY BLOOD GLUCOSE      POCT Blood Glucose.: 95 mg/dL (22 Mar 2020 17:52)  POCT Blood Glucose.: 107 mg/dL (22 Mar 2020 12:53)  POCT Blood Glucose.: 96 mg/dL (22 Mar 2020 09:50)    I&O's Summary    22 Mar 2020 07:01  -  22 Mar 2020 20:19  --------------------------------------------------------  IN: 0 mL / OUT: 1000 mL / NET: -1000 mL        PHYSICAL EXAM:  Vital Signs Last 24 Hrs  T(C): 36.7 (22 Mar 2020 20:00), Max: 38 (22 Mar 2020 09:57)  T(F): 98 (22 Mar 2020 20:00), Max: 100.4 (22 Mar 2020 09:57)  HR: 120 (22 Mar 2020 20:00) (95 - 125)  BP: 115/87 (22 Mar 2020 20:00) (110/57 - 133/70)  BP(mean): --  RR: 20 (22 Mar 2020 20:00) (13 - 20)  SpO2: 96% (22 Mar 2020 20:00) (87% - 98%)  CONSTITUTIONAL: NAD, disheveled   EYES: EOMI; conjunctiva and sclera clear  ENMT: Moist oral mucosa, no pharyngeal injection or exudates; poor dentition  NECK: Supple,  RESPIRATORY: Normal respiratory effort; lungs are clear to auscultation bilaterally  CARDIOVASCULAR: tachycardic, regular rhythm, normal S1 and S2, no murmur/rub/gallop;  +b/l LE pitting edema, w/chronic venous stasis changes and areas of skin ulceration on bottom of feet and around ankles. no infection noted   ABDOMEN: Nontender to palpation, normoactive bowel sounds, no rebound/guarding; No hepatosplenomegaly  MUSKULOSKELETAL:  no clubbing or cyanosis of digits; no joint swelling or tenderness to palpation  PSYCH: A+O to person, place, and time; odd affect, redirectable  NEUROLOGY: CN 2-12 are intact and symmetric; no gross sensory deficits;   SKIN: No rashes; no palpable lesions    LABS:                        12.4   8.39  )-----------( 557      ( 22 Mar 2020 06:50 )             39.7     03-22    138  |  98  |  10  ----------------------------<  115<H>  4.3   |  30  |  0.84    Ca    9.1      22 Mar 2020 06:50  Phos  3.5     -  Mg     1.9     -    TPro  6.9  /  Alb  3.1<L>  /  TBili  < 0.2<L>  /  DBili  x   /  AST  17  /  ALT  11  /  AlkPhos  72            Urinalysis Basic - ( 21 Mar 2020 20:58 )    Color: LIGHT YELLOW / Appearance: CLEAR / S.017 / pH: 6.5  Gluc: NEGATIVE / Ketone: NEGATIVE  / Bili: NEGATIVE / Urobili: NORMAL   Blood: NEGATIVE / Protein: NEGATIVE / Nitrite: NEGATIVE   Leuk Esterase: NEGATIVE / RBC: x / WBC x   Sq Epi: x / Non Sq Epi: x / Bacteria: x          RADIOLOGY & ADDITIONAL TESTS:  Results Reviewed:   Imaging Personally Reviewed:  Electrocardiogram Personally Reviewed:    COORDINATION OF CARE:  Care Discussed with Consultants/Other Providers [Y/N]: Y  Prior or Outpatient Records Reviewed [Y/N]: Y 78

## 2024-08-16 NOTE — ASU PATIENT PROFILE, ADULT - FALL HARM RISK - UNIVERSAL INTERVENTIONS
Bed in lowest position, wheels locked, appropriate side rails in place/Call bell, personal items and telephone in reach/Instruct patient to call for assistance before getting out of bed or chair/Non-slip footwear when patient is out of bed/[place] to call system/Physically safe environment - no spills, clutter or unnecessary equipment/Purposeful Proactive Rounding/Room/bathroom lighting operational, light cord in reach

## 2024-08-18 ENCOUNTER — TRANSCRIPTION ENCOUNTER (OUTPATIENT)
Age: 69
End: 2024-08-18

## 2024-08-19 ENCOUNTER — RESULT REVIEW (OUTPATIENT)
Age: 69
End: 2024-08-19

## 2024-08-19 ENCOUNTER — INPATIENT (INPATIENT)
Facility: HOSPITAL | Age: 69
LOS: 0 days | Discharge: HOME | DRG: 661 | End: 2024-08-20
Attending: STUDENT IN AN ORGANIZED HEALTH CARE EDUCATION/TRAINING PROGRAM | Admitting: STUDENT IN AN ORGANIZED HEALTH CARE EDUCATION/TRAINING PROGRAM
Payer: COMMERCIAL

## 2024-08-19 ENCOUNTER — APPOINTMENT (OUTPATIENT)
Dept: UROLOGY | Facility: HOSPITAL | Age: 69
End: 2024-08-19

## 2024-08-19 DIAGNOSIS — N20.0 CALCULUS OF KIDNEY: ICD-10-CM

## 2024-08-19 DIAGNOSIS — Z98.890 OTHER SPECIFIED POSTPROCEDURAL STATES: Chronic | ICD-10-CM

## 2024-08-19 LAB
ANION GAP SERPL CALC-SCNC: 9 MMOL/L — SIGNIFICANT CHANGE UP (ref 5–17)
BASOPHILS # BLD AUTO: 0.06 K/UL — SIGNIFICANT CHANGE UP (ref 0–0.2)
BASOPHILS NFR BLD AUTO: 0.6 % — SIGNIFICANT CHANGE UP (ref 0–2)
BLD GP AB SCN SERPL QL: NEGATIVE — SIGNIFICANT CHANGE UP
BUN SERPL-MCNC: 15 MG/DL — SIGNIFICANT CHANGE UP (ref 7–23)
CALCIUM SERPL-MCNC: 9.1 MG/DL — SIGNIFICANT CHANGE UP (ref 8.4–10.5)
CHLORIDE SERPL-SCNC: 108 MMOL/L — SIGNIFICANT CHANGE UP (ref 96–108)
CO2 SERPL-SCNC: 24 MMOL/L — SIGNIFICANT CHANGE UP (ref 22–31)
CREAT SERPL-MCNC: 1.04 MG/DL — SIGNIFICANT CHANGE UP (ref 0.5–1.3)
EGFR: 78 ML/MIN/1.73M2 — SIGNIFICANT CHANGE UP
EOSINOPHIL # BLD AUTO: 0.04 K/UL — SIGNIFICANT CHANGE UP (ref 0–0.5)
EOSINOPHIL NFR BLD AUTO: 0.4 % — SIGNIFICANT CHANGE UP (ref 0–6)
GLUCOSE SERPL-MCNC: 164 MG/DL — HIGH (ref 70–99)
HCT VFR BLD CALC: 39.8 % — SIGNIFICANT CHANGE UP (ref 39–50)
HGB BLD-MCNC: 12.6 G/DL — LOW (ref 13–17)
IMM GRANULOCYTES NFR BLD AUTO: 0.6 % — SIGNIFICANT CHANGE UP (ref 0–0.9)
LYMPHOCYTES # BLD AUTO: 1.07 K/UL — SIGNIFICANT CHANGE UP (ref 1–3.3)
LYMPHOCYTES # BLD AUTO: 9.9 % — LOW (ref 13–44)
MCHC RBC-ENTMCNC: 26.6 PG — LOW (ref 27–34)
MCHC RBC-ENTMCNC: 31.7 GM/DL — LOW (ref 32–36)
MCV RBC AUTO: 84 FL — SIGNIFICANT CHANGE UP (ref 80–100)
MONOCYTES # BLD AUTO: 0.16 K/UL — SIGNIFICANT CHANGE UP (ref 0–0.9)
MONOCYTES NFR BLD AUTO: 1.5 % — LOW (ref 2–14)
NEUTROPHILS # BLD AUTO: 9.41 K/UL — HIGH (ref 1.8–7.4)
NEUTROPHILS NFR BLD AUTO: 87 % — HIGH (ref 43–77)
NRBC # BLD: 0 /100 WBCS — SIGNIFICANT CHANGE UP (ref 0–0)
PLATELET # BLD AUTO: 267 K/UL — SIGNIFICANT CHANGE UP (ref 150–400)
POTASSIUM SERPL-MCNC: 4.4 MMOL/L — SIGNIFICANT CHANGE UP (ref 3.5–5.3)
POTASSIUM SERPL-SCNC: 4.4 MMOL/L — SIGNIFICANT CHANGE UP (ref 3.5–5.3)
RBC # BLD: 4.74 M/UL — SIGNIFICANT CHANGE UP (ref 4.2–5.8)
RBC # FLD: 12.6 % — SIGNIFICANT CHANGE UP (ref 10.3–14.5)
RH IG SCN BLD-IMP: POSITIVE — SIGNIFICANT CHANGE UP
SODIUM SERPL-SCNC: 141 MMOL/L — SIGNIFICANT CHANGE UP (ref 135–145)
WBC # BLD: 10.81 K/UL — HIGH (ref 3.8–10.5)
WBC # FLD AUTO: 10.81 K/UL — HIGH (ref 3.8–10.5)

## 2024-08-19 PROCEDURE — 50081 PERQ NL/PL LITHOTRP CPLX>2CM: CPT | Mod: RT

## 2024-08-19 PROCEDURE — 50437 DILAT XST TRC NEW ACCESS RCS: CPT | Mod: RT,59

## 2024-08-19 PROCEDURE — 74420 UROGRAPHY RTRGR +-KUB: CPT | Mod: 26

## 2024-08-19 PROCEDURE — 52356 CYSTO/URETERO W/LITHOTRIPSY: CPT | Mod: RT,59

## 2024-08-19 PROCEDURE — 88300 SURGICAL PATH GROSS: CPT | Mod: 26

## 2024-08-19 DEVICE — NEPHROSTOMY BALLOON CATH NEPHROMAX 24FR 17CM PTFE: Type: IMPLANTABLE DEVICE | Site: RIGHT | Status: FUNCTIONAL

## 2024-08-19 DEVICE — LASER FIBER SOLTIVE 200 BALL TIP: Type: IMPLANTABLE DEVICE | Site: RIGHT | Status: FUNCTIONAL

## 2024-08-19 DEVICE — STONE BASKET DAKOTA 1.9FR 8MMX120CM: Type: IMPLANTABLE DEVICE | Site: RIGHT | Status: FUNCTIONAL

## 2024-08-19 DEVICE — URETERAL STENT PERCUFLEX PLUS 6FR 24CM: Type: IMPLANTABLE DEVICE | Site: RIGHT | Status: FUNCTIONAL

## 2024-08-19 DEVICE — STONE BASKET ZEROTIP NITINOL 4-WIRE 1.9FR 120CM X 12MM: Type: IMPLANTABLE DEVICE | Site: RIGHT | Status: FUNCTIONAL

## 2024-08-19 DEVICE — GUIDEWIRE AMPLATZ SUPER-STIFF .038" X 145CM 3.5CM FLEXIBLE: Type: IMPLANTABLE DEVICE | Site: RIGHT | Status: FUNCTIONAL

## 2024-08-19 DEVICE — URETERAL STENT CONTOUR 6FR 26CM: Type: IMPLANTABLE DEVICE | Site: RIGHT | Status: FUNCTIONAL

## 2024-08-19 DEVICE — URETEROSCOPE LITHOVUE DISP: Type: IMPLANTABLE DEVICE | Site: RIGHT | Status: FUNCTIONAL

## 2024-08-19 DEVICE — URETERAL CATH DUAL LUMEN 10FR 54CM: Type: IMPLANTABLE DEVICE | Site: RIGHT | Status: FUNCTIONAL

## 2024-08-19 DEVICE — URETERAL CATH OPEN END FLEXI-TIP 5FR .038" X 70CM: Type: IMPLANTABLE DEVICE | Site: RIGHT | Status: FUNCTIONAL

## 2024-08-19 DEVICE — GUIDEWIRE SENSOR DUAL-FLEX NITINOL STRAIGHT .038" X 150CM: Type: IMPLANTABLE DEVICE | Site: RIGHT | Status: FUNCTIONAL

## 2024-08-19 DEVICE — TRILOGY PROBE KIT 3.9MM X 440MM (BLUE): Type: IMPLANTABLE DEVICE | Site: RIGHT | Status: FUNCTIONAL

## 2024-08-19 RX ORDER — ONDANSETRON 2 MG/ML
4 INJECTION, SOLUTION INTRAMUSCULAR; INTRAVENOUS EVERY 6 HOURS
Refills: 0 | Status: DISCONTINUED | OUTPATIENT
Start: 2024-08-19 | End: 2024-08-20

## 2024-08-19 RX ORDER — FOLIC ACID/MULTIVIT,IRON,MINER 0.4MG-18MG
2 TABLET,CHEWABLE ORAL
Refills: 0 | DISCHARGE

## 2024-08-19 RX ORDER — ACETAMINOPHEN 325 MG/1
975 TABLET ORAL EVERY 6 HOURS
Refills: 0 | Status: DISCONTINUED | OUTPATIENT
Start: 2024-08-19 | End: 2024-08-20

## 2024-08-19 RX ORDER — SODIUM CHLORIDE 9 MG/ML
1000 INJECTION INTRAMUSCULAR; INTRAVENOUS; SUBCUTANEOUS
Refills: 0 | Status: COMPLETED | OUTPATIENT
Start: 2024-08-19 | End: 2024-08-19

## 2024-08-19 RX ORDER — HYDROMORPHONE HYDROCHLORIDE 2 MG/1
0.5 TABLET ORAL EVERY 4 HOURS
Refills: 0 | Status: DISCONTINUED | OUTPATIENT
Start: 2024-08-19 | End: 2024-08-20

## 2024-08-19 RX ORDER — OXYBUTYNIN CHLORIDE 5 MG/1
5 TABLET ORAL EVERY 8 HOURS
Refills: 0 | Status: DISCONTINUED | OUTPATIENT
Start: 2024-08-19 | End: 2024-08-20

## 2024-08-19 RX ORDER — CEFAZOLIN SODIUM 2 G/100ML
2000 INJECTION, SOLUTION INTRAVENOUS EVERY 8 HOURS
Refills: 0 | Status: COMPLETED | OUTPATIENT
Start: 2024-08-19 | End: 2024-08-20

## 2024-08-19 RX ORDER — HEPARIN SODIUM,BOVINE 1000/ML
5000 VIAL (ML) INJECTION EVERY 8 HOURS
Refills: 0 | Status: DISCONTINUED | OUTPATIENT
Start: 2024-08-19 | End: 2024-08-20

## 2024-08-19 RX ORDER — DIAZEPAM 10 MG
5 TABLET ORAL EVERY 8 HOURS
Refills: 0 | Status: DISCONTINUED | OUTPATIENT
Start: 2024-08-19 | End: 2024-08-19

## 2024-08-19 RX ORDER — OXYCODONE HYDROCHLORIDE 5 MG/1
5 TABLET ORAL EVERY 6 HOURS
Refills: 0 | Status: DISCONTINUED | OUTPATIENT
Start: 2024-08-19 | End: 2024-08-20

## 2024-08-19 RX ORDER — FINASTERIDE 1 MG/1
1 TABLET, FILM COATED ORAL
Refills: 0 | DISCHARGE

## 2024-08-19 RX ORDER — KETOROLAC TROMETHAMINE 30 MG/ML
15 INJECTION, SOLUTION INTRAMUSCULAR EVERY 6 HOURS
Refills: 0 | Status: DISCONTINUED | OUTPATIENT
Start: 2024-08-19 | End: 2024-08-20

## 2024-08-19 RX ORDER — KETOROLAC TROMETHAMINE 30 MG/ML
15 INJECTION, SOLUTION INTRAMUSCULAR EVERY 6 HOURS
Refills: 0 | Status: DISCONTINUED | OUTPATIENT
Start: 2024-08-19 | End: 2024-08-19

## 2024-08-19 RX ORDER — TAMSULOSIN HYDROCHLORIDE 0.4 MG/1
2 CAPSULE ORAL
Refills: 0 | DISCHARGE

## 2024-08-19 RX ORDER — ALLOPURINOL 300 MG
1 TABLET ORAL
Refills: 0 | DISCHARGE

## 2024-08-19 RX ADMIN — HYDROMORPHONE HYDROCHLORIDE 0.5 MILLIGRAM(S): 2 TABLET ORAL at 16:56

## 2024-08-19 RX ADMIN — OXYCODONE HYDROCHLORIDE 5 MILLIGRAM(S): 5 TABLET ORAL at 17:50

## 2024-08-19 RX ADMIN — ACETAMINOPHEN 975 MILLIGRAM(S): 325 TABLET ORAL at 19:04

## 2024-08-19 RX ADMIN — KETOROLAC TROMETHAMINE 15 MILLIGRAM(S): 30 INJECTION, SOLUTION INTRAMUSCULAR at 21:27

## 2024-08-19 RX ADMIN — CEFAZOLIN SODIUM 100 MILLIGRAM(S): 2 INJECTION, SOLUTION INTRAVENOUS at 21:26

## 2024-08-19 RX ADMIN — HYDROMORPHONE HYDROCHLORIDE 0.5 MILLIGRAM(S): 2 TABLET ORAL at 16:39

## 2024-08-19 RX ADMIN — OXYCODONE HYDROCHLORIDE 5 MILLIGRAM(S): 5 TABLET ORAL at 18:45

## 2024-08-19 RX ADMIN — OXYBUTYNIN CHLORIDE 5 MILLIGRAM(S): 5 TABLET ORAL at 17:04

## 2024-08-19 RX ADMIN — Medication 5000 UNIT(S): at 21:26

## 2024-08-19 RX ADMIN — SODIUM CHLORIDE 120 MILLILITER(S): 9 INJECTION INTRAMUSCULAR; INTRAVENOUS; SUBCUTANEOUS at 17:51

## 2024-08-19 NOTE — H&P ADULT - ASSESSMENT
69-year-old M with a rights-sided staghorn stone and several right lower pole calyceal stones. Presents fo R PCNL    OR   Admit

## 2024-08-19 NOTE — H&P ADULT - NSVTERISKREFERASSESS_GEN_ALL_CORE
Testosterone Pending    Insurance response  Prescription Drug Insurance: [employer]  Notes: Prior authorization submitted - will update provider when decision has been made by insurance.            Refer to the Assessment tab to view/cancel completed assessment.

## 2024-08-19 NOTE — H&P ADULT - NSHPPHYSICALEXAM_GEN_ALL_CORE
Constitutional: normal appearance, well groomed and no acute distress.    Cardiovascular:. no peripheral edema.    Pulmonary: no respiratory distress, normal respiratory rhythm and effort and no accessory muscle use.    Abdomen: soft, non-tender and no costovertebral angle tenderness.     Musculoskeletal:. the gait and station were normal for the patient's age.    Skin: no rash.    Neurological: no focal deficits.    Psychiatric: oriented to person, place, and time, the affect was normal and the mood was normal.    Lymphatics: no palpable adenopathy.

## 2024-08-19 NOTE — H&P ADULT - HISTORY OF PRESENT ILLNESS
CC: kidney stones  ?  History of Present Illness: SILVANO BARNES is a 69 year old male with a PMH of BPH, kidney stones, HLD who presents for evaluation of kidney stones. He has gotten about 1 UTI /year for several years and was treated about 4 weeks ago for an infection. On 7/2 he developed gross hematuria and was evaluated in an ER. He required a sears catheter as he was not able to empty his bladder well and urine remained persistently bloody. CT at that time redemonstrated known large right sided stone. On 7/5 the catheter stopped draining and he was reevaluated then admitted to the hospital requiring CBI. He is still unable to empty his bladder well and is requring CIC two times per day, with residuals about 500ml. He takes 0.8mg Flomax per day and was just started on finasteride. He has been aware of the stone for about 4-5 years ago, but it was not causing issues so he did not have it treated.  ?  ?  Imaging: CT scan from 07/03/2024 can be found in [facility] PACS. Findings: Staghorn calculus in the right renal pelvis/collecting system measuring up to 2.9 cm with associated moderate hydronephrosis. Multiple nonobstructing right renal calculi. Mild bilateral perinephric stranding. Bilateral renal cysts. Diffuse bladder wall thickening, though decompressed around a Sears catheter.  ?  Previous urine cultures:  ?  Kidney Stone History:  First-time stone former - No  Concurrent asymptomatic stone(s) - Yes  Previous stone surgeries - SWL about 15 years ago  Previous passed stones - No  Comorbidities - non-contributory  Family history of kidney stones - No  Previous metabolic evaluation - Several years ago

## 2024-08-19 NOTE — BRIEF OPERATIVE NOTE - NSICDXBRIEFPROCEDURE_GEN_ALL_CORE_FT
PROCEDURES:  Nephrolithotomy, percutaneous, with C-arm fluoroscopic guidance 19-Aug-2024 16:28:17  Dino Méndez

## 2024-08-20 ENCOUNTER — TRANSCRIPTION ENCOUNTER (OUTPATIENT)
Age: 69
End: 2024-08-20

## 2024-08-20 ENCOUNTER — NON-APPOINTMENT (OUTPATIENT)
Age: 69
End: 2024-08-20

## 2024-08-20 VITALS
OXYGEN SATURATION: 98 % | HEART RATE: 67 BPM | DIASTOLIC BLOOD PRESSURE: 71 MMHG | RESPIRATION RATE: 18 BRPM | SYSTOLIC BLOOD PRESSURE: 111 MMHG | TEMPERATURE: 98 F

## 2024-08-20 LAB
ADD ON TEST-SPECIMEN IN LAB: SIGNIFICANT CHANGE UP
ANION GAP SERPL CALC-SCNC: 9 MMOL/L — SIGNIFICANT CHANGE UP (ref 5–17)
BASOPHILS # BLD AUTO: 0 K/UL — SIGNIFICANT CHANGE UP (ref 0–0.2)
BASOPHILS NFR BLD AUTO: 0 % — SIGNIFICANT CHANGE UP (ref 0–2)
BUN SERPL-MCNC: 17 MG/DL — SIGNIFICANT CHANGE UP (ref 7–23)
CALCIUM SERPL-MCNC: 8.6 MG/DL — SIGNIFICANT CHANGE UP (ref 8.4–10.5)
CHLORIDE SERPL-SCNC: 106 MMOL/L — SIGNIFICANT CHANGE UP (ref 96–108)
CO2 SERPL-SCNC: 24 MMOL/L — SIGNIFICANT CHANGE UP (ref 22–31)
CREAT SERPL-MCNC: 1.12 MG/DL — SIGNIFICANT CHANGE UP (ref 0.5–1.3)
EGFR: 72 ML/MIN/1.73M2 — SIGNIFICANT CHANGE UP
EOSINOPHIL # BLD AUTO: 0 K/UL — SIGNIFICANT CHANGE UP (ref 0–0.5)
EOSINOPHIL NFR BLD AUTO: 0 % — SIGNIFICANT CHANGE UP (ref 0–6)
GIANT PLATELETS BLD QL SMEAR: PRESENT — SIGNIFICANT CHANGE UP
GLUCOSE SERPL-MCNC: 161 MG/DL — HIGH (ref 70–99)
HCT VFR BLD CALC: 36.8 % — LOW (ref 39–50)
HGB BLD-MCNC: 11.8 G/DL — LOW (ref 13–17)
LYMPHOCYTES # BLD AUTO: 0.45 K/UL — LOW (ref 1–3.3)
LYMPHOCYTES # BLD AUTO: 2.6 % — LOW (ref 13–44)
MAGNESIUM SERPL-MCNC: 1.7 MG/DL — SIGNIFICANT CHANGE UP (ref 1.6–2.6)
MANUAL SMEAR VERIFICATION: SIGNIFICANT CHANGE UP
MCHC RBC-ENTMCNC: 27.7 PG — SIGNIFICANT CHANGE UP (ref 27–34)
MCHC RBC-ENTMCNC: 32.1 GM/DL — SIGNIFICANT CHANGE UP (ref 32–36)
MCV RBC AUTO: 86.4 FL — SIGNIFICANT CHANGE UP (ref 80–100)
METAMYELOCYTES # FLD: 0.9 % — HIGH (ref 0–0)
MONOCYTES # BLD AUTO: 1.35 K/UL — HIGH (ref 0–0.9)
MONOCYTES NFR BLD AUTO: 7.9 % — SIGNIFICANT CHANGE UP (ref 2–14)
NEUTROPHILS # BLD AUTO: 15.17 K/UL — HIGH (ref 1.8–7.4)
NEUTROPHILS NFR BLD AUTO: 88.6 % — HIGH (ref 43–77)
NRBC # BLD: 0 /100 WBCS — SIGNIFICANT CHANGE UP (ref 0–0)
OVALOCYTES BLD QL SMEAR: SLIGHT — SIGNIFICANT CHANGE UP
PHOSPHATE SERPL-MCNC: 3.3 MG/DL — SIGNIFICANT CHANGE UP (ref 2.5–4.5)
PLAT MORPH BLD: ABNORMAL
PLATELET # BLD AUTO: 253 K/UL — SIGNIFICANT CHANGE UP (ref 150–400)
POIKILOCYTOSIS BLD QL AUTO: SLIGHT — SIGNIFICANT CHANGE UP
POLYCHROMASIA BLD QL SMEAR: SLIGHT — SIGNIFICANT CHANGE UP
POTASSIUM SERPL-MCNC: 4.1 MMOL/L — SIGNIFICANT CHANGE UP (ref 3.5–5.3)
POTASSIUM SERPL-SCNC: 4.1 MMOL/L — SIGNIFICANT CHANGE UP (ref 3.5–5.3)
RBC # BLD: 4.26 M/UL — SIGNIFICANT CHANGE UP (ref 4.2–5.8)
RBC # FLD: 12.7 % — SIGNIFICANT CHANGE UP (ref 10.3–14.5)
RBC BLD AUTO: ABNORMAL
SODIUM SERPL-SCNC: 139 MMOL/L — SIGNIFICANT CHANGE UP (ref 135–145)
WBC # BLD: 17.12 K/UL — HIGH (ref 3.8–10.5)
WBC # FLD AUTO: 17.12 K/UL — HIGH (ref 3.8–10.5)

## 2024-08-20 PROCEDURE — 87070 CULTURE OTHR SPECIMN AEROBIC: CPT

## 2024-08-20 PROCEDURE — 88300 SURGICAL PATH GROSS: CPT

## 2024-08-20 PROCEDURE — 76000 FLUOROSCOPY <1 HR PHYS/QHP: CPT

## 2024-08-20 PROCEDURE — C1769: CPT

## 2024-08-20 PROCEDURE — 86850 RBC ANTIBODY SCREEN: CPT

## 2024-08-20 PROCEDURE — 85025 COMPLETE CBC W/AUTO DIFF WBC: CPT

## 2024-08-20 PROCEDURE — 82365 CALCULUS SPECTROSCOPY: CPT

## 2024-08-20 PROCEDURE — 85027 COMPLETE CBC AUTOMATED: CPT

## 2024-08-20 PROCEDURE — 83735 ASSAY OF MAGNESIUM: CPT

## 2024-08-20 PROCEDURE — 86901 BLOOD TYPING SEROLOGIC RH(D): CPT

## 2024-08-20 PROCEDURE — 84100 ASSAY OF PHOSPHORUS: CPT

## 2024-08-20 PROCEDURE — 80048 BASIC METABOLIC PNL TOTAL CA: CPT

## 2024-08-20 PROCEDURE — C1889: CPT

## 2024-08-20 PROCEDURE — C1747: CPT

## 2024-08-20 PROCEDURE — 87086 URINE CULTURE/COLONY COUNT: CPT

## 2024-08-20 PROCEDURE — 36415 COLL VENOUS BLD VENIPUNCTURE: CPT

## 2024-08-20 PROCEDURE — 86900 BLOOD TYPING SEROLOGIC ABO: CPT

## 2024-08-20 PROCEDURE — C1758: CPT

## 2024-08-20 PROCEDURE — C1726: CPT

## 2024-08-20 PROCEDURE — C2617: CPT

## 2024-08-20 RX ORDER — PHENAZOPYRIDINE HCL 100 MG
1 TABLET ORAL
Qty: 9 | Refills: 0
Start: 2024-08-20 | End: 2024-08-22

## 2024-08-20 RX ORDER — CEFDINIR 300 MG/1
1 CAPSULE ORAL
Qty: 6 | Refills: 0
Start: 2024-08-20 | End: 2024-08-22

## 2024-08-20 RX ORDER — OXYBUTYNIN CHLORIDE 5 MG/1
1 TABLET ORAL
Qty: 9 | Refills: 0
Start: 2024-08-20 | End: 2024-08-22

## 2024-08-20 RX ADMIN — ACETAMINOPHEN 975 MILLIGRAM(S): 325 TABLET ORAL at 12:08

## 2024-08-20 RX ADMIN — KETOROLAC TROMETHAMINE 15 MILLIGRAM(S): 30 INJECTION, SOLUTION INTRAMUSCULAR at 14:14

## 2024-08-20 RX ADMIN — CEFAZOLIN SODIUM 100 MILLIGRAM(S): 2 INJECTION, SOLUTION INTRAVENOUS at 05:47

## 2024-08-20 RX ADMIN — ACETAMINOPHEN 975 MILLIGRAM(S): 325 TABLET ORAL at 05:46

## 2024-08-20 RX ADMIN — KETOROLAC TROMETHAMINE 15 MILLIGRAM(S): 30 INJECTION, SOLUTION INTRAMUSCULAR at 03:14

## 2024-08-20 RX ADMIN — Medication 5000 UNIT(S): at 14:14

## 2024-08-20 RX ADMIN — KETOROLAC TROMETHAMINE 15 MILLIGRAM(S): 30 INJECTION, SOLUTION INTRAMUSCULAR at 08:18

## 2024-08-20 RX ADMIN — Medication 5000 UNIT(S): at 05:45

## 2024-08-20 NOTE — DISCHARGE NOTE PROVIDER - CARE PROVIDER_API CALL
Martin Eden.  Urology  [address]  [place] [address]  Phone: [phone]  Fax: [phone]  Follow Up Time: 1 week

## 2024-08-20 NOTE — DISCHARGE NOTE PROVIDER - NSDCMRMEDTOKEN_GEN_ALL_CORE_FT
allopurinol 300 mg oral tablet: 1 tab(s) orally once a day  cholecalciferol 25 mcg (1000 intl units) oral tablet: 2 tab(s) orally once a day  finasteride 5 mg oral tablet: 1 tab(s) orally once a day  oxyBUTYnin 5 mg oral tablet: 1 tab(s) orally every 8 hours as needed for Bladder spasms MDD: 3  pravastatin 10 mg oral tablet: 1 tab(s) orally once a day  Pyridium 200 mg oral tablet: 1 tab(s) orally every 8 hours MDD: 3  tamsulosin 0.4 mg oral capsule: 2 cap(s) orally once a day (at bedtime)   allopurinol 300 mg oral tablet: 1 tab(s) orally once a day  cefdinir 300 mg oral capsule: 1 cap(s) orally 2 times a day  cholecalciferol 25 mcg (1000 intl units) oral tablet: 2 tab(s) orally once a day  finasteride 5 mg oral tablet: 1 tab(s) orally once a day  oxyBUTYnin 5 mg oral tablet: 1 tab(s) orally every 8 hours as needed for Bladder spasms MDD: 3  pravastatin 10 mg oral tablet: 1 tab(s) orally once a day  Pyridium 200 mg oral tablet: 1 tab(s) orally every 8 hours MDD: 3  tamsulosin 0.4 mg oral capsule: 2 cap(s) orally once a day (at bedtime)

## 2024-08-20 NOTE — DISCHARGE NOTE PROVIDER - NSDCCPGOAL_GEN_ALL_CORE_FT
To get better and follow your care plan as instructed. Ambulation, hydration, and return to activity of daily living. with patient

## 2024-08-20 NOTE — DISCHARGE NOTE NURSING/CASE MANAGEMENT/SOCIAL WORK - PATIENT PORTAL LINK FT
You can access the FollowMyHealth Patient Portal offered by [facility] by registering at the following website: [email or web address]. By joining [employer]’s FollowMyHealth portal, you will also be able to view your health information using other applications (apps) compatible with our system.

## 2024-08-20 NOTE — DISCHARGE NOTE PROVIDER - NSDCCPCAREPLAN_GEN_ALL_CORE_FT
PRINCIPAL DISCHARGE DIAGNOSIS  Diagnosis: Staghorn calculus  Assessment and Plan of Treatment:      PRINCIPAL DISCHARGE DIAGNOSIS  Diagnosis: Staghorn calculus  Assessment and Plan of Treatment: Complete the course of antibiotics as prescribed. Follow up with Dr Eden as scheduled Friday. Use Flomax daily for stent colic relief. Use Oxybutynin as second line spasm medication (oxybutynin can cause urinary retention, if unable to void fully it is prefered to hold this medication)

## 2024-08-20 NOTE — DISCHARGE NOTE PROVIDER - NSDCCPTREATMENT_GEN_ALL_CORE_FT
PRINCIPAL PROCEDURE  Procedure: Nephrolithotomy, percutaneous, with C-arm fluoroscopic guidance  Findings and Treatment:

## 2024-08-20 NOTE — DISCHARGE NOTE PROVIDER - NSDCFUADDINST_GEN_ALL_CORE_FT
Take your Prescriptions as prescribed. Please note that Pyridium can turn your urine orange. Take the Antibiotics until completed please.    Walking is essential to your recovery, please walk around as much as you can tolerate once home, try not to stay in bed for too long, as this can slow down your recovery.   Drink plenty of water to flush out the bladder.   To avoid constipation, take a stool softener as needed.   Blood in your urine. It's normal to see blood right after surgery. Urination might be painful, or you might have a sense of urgency or frequent need to urinate. This is normal.    No heavy lifting or straining. Stay well hydrated. If fever >100.4 or any change or worsening of symptoms please call doctor or report to ED. Make follow up appointment with Dr. Eden.    If you have pain you may take Tylenol 650mg every 6-8hrs as needed.    Take your Prescriptions as prescribed. Please note that Pyridium can turn your urine orange. Take the Antibiotics until completed please.    Please do self CIC catheterization at home 3times a day until bladder function returns.    Walking is essential to your recovery, please walk around as much as you can tolerate once home, try not to stay in bed for too long, as this can slow down your recovery.   Drink plenty of water to flush out the bladder.   To avoid constipation, take a stool softener as needed.   Blood in your urine. It's normal to see blood right after surgery. Urination might be painful, or you might have a sense of urgency or frequent need to urinate. This is normal.    No heavy lifting or straining. Stay well hydrated. If fever >100.4 or any change or worsening of symptoms please call doctor or report to ED. Make follow up appointment with Dr. Eden.    If you have pain you may take Tylenol 650mg every 6-8hrs as needed.

## 2024-08-20 NOTE — PROGRESS NOTE ADULT - SUBJECTIVE AND OBJECTIVE BOX
INTERVAL HPI/OVERNIGHT EVENTS:  No acute events overnight.    VITALS:    T(F): 98 (08-20-24 @ 00:15), Max: 98 (08-20-24 @ 00:15)  HR: 77 (08-20-24 @ 00:15) (56 - 97)  BP: 100/64 (08-20-24 @ 00:15) (100/64 - 153/81)  RR: 17 (08-20-24 @ 00:15) (14 - 17)  SpO2: 95% (08-20-24 @ 00:15) (94% - 99%)  Wt(kg): --    I&O's Detail    19 Aug 2024 07:01  -  20 Aug 2024 05:29  --------------------------------------------------------  IN:    sodium chloride 0.9%: 840 mL  Total IN: 840 mL    OUT:    Indwelling Catheter - Urethral (mL): 900 mL  Total OUT: 900 mL    Total NET: -60 mL          MEDICATIONS:    ANTIBIOTICS:  ceFAZolin   IVPB 2000 milliGRAM(s) IV Intermittent every 8 hours      PAIN CONTROL:  acetaminophen     Tablet .. 975 milliGRAM(s) Oral every 6 hours  HYDROmorphone  Injectable 0.5 milliGRAM(s) IV Push every 4 hours PRN  ketorolac   Injectable 15 milliGRAM(s) IV Push every 6 hours  ondansetron Injectable 4 milliGRAM(s) IV Push every 6 hours PRN  oxyCODONE    IR 5 milliGRAM(s) Oral every 6 hours PRN       MEDS:  oxybutynin 5 milliGRAM(s) Oral every 8 hours PRN      HEME/ONC  heparin   Injectable 5000 Unit(s) SubCutaneous every 8 hours        PHYSICAL EXAM:  General: No acute distress.  Alert and Oriented  Abdominal Exam: soft, NT, ND   Exam: FC intact, urine punch color  Dressing R flank clean and dry    LABS:                        12.6   10.81 )-----------( 267      ( 19 Aug 2024 17:27 )             39.8     08-19    141  |  108  |  15  ----------------------------<  164<H>  4.4   |  24  |  1.04    Ca    9.1      19 Aug 2024 17:27        Urinalysis Basic - ( 19 Aug 2024 17:27 )    Color: x / Appearance: x / SG: x / pH: x  Gluc: 164 mg/dL / Ketone: x  / Bili: x / Urobili: x   Blood: x / Protein: x / Nitrite: x   Leuk Esterase: x / RBC: x / WBC x   Sq Epi: x / Non Sq Epi: x / Bacteria: x        RADIOLOGY & ADDITIONAL TESTS:     
  UROLOGY POST OP NOTE (PAGER # [phone])    PROCEDURE: R PCNL    T(C): 36.3 (08-19-24 @ 17:51), Max: 36.5 (08-19-24 @ 16:23)  HR: 97 (08-19-24 @ 17:51) (56 - 97)  BP: 147/83 (08-19-24 @ 17:51) (130/74 - 153/81)  RR: 17 (08-19-24 @ 17:51) (14 - 17)  SpO2: 97% (08-19-24 @ 17:51) (94% - 99%)  Wt(kg): --    Subjective: pain reports R sided abd pain. IV pain meds ordered. Denies CP, SOB, N, V  ON PE: awake and alert    Abdomen: nondistended, nontender    : no suprapubic/CVAT                          12.6   10.81 )-----------( 267      ( 19 Aug 2024 17:27 )             39.8     08-19    141  |  108  |  15  ----------------------------<  164<H>  4.4   |  24  |  1.04    Ca    9.1      19 Aug 2024 17:27        A/P:

## 2024-08-20 NOTE — DISCHARGE NOTE PROVIDER - HOSPITAL COURSE
Patient is a 69y old  Male who presents with a chief complaint of R PCNL (20 Aug 2024 05:28)      HPI:  CC: kidney stones  History of Present Illness: SILVANO BARNES is a 69 year old male with a PMH of BPH, kidney stones, HLD who presents for evaluation of kidney stones. He has gotten about 1 UTI /year for several years and was treated about 4 weeks ago for an infection. On 7/2 he developed gross hematuria and was evaluated in an ER. He required a sears catheter as he was not able to empty his bladder well and urine remained persistently bloody. CT at that time redemonstrated known large right sided stone. On 7/5 the catheter stopped draining and he was reevaluated then admitted to the hospital requiring CBI. He is still unable to empty his bladder well and is requring CIC two times per day, with residuals about 500ml. He takes 0.8mg Flomax per day and was just started on finasteride. He has been aware of the stone for about 4-5 years ago, but it was not causing issues so he did not have it treated.    Imaging: CT scan from 07/03/2024 can be found in [facility] PACS. Findings: Staghorn calculus in the right renal pelvis/collecting system measuring up to 2.9 cm with associated moderate hydronephrosis. Multiple nonobstructing right renal calculi. Mild bilateral perinephric stranding. Bilateral renal cysts. Diffuse bladder wall thickening, though decompressed around a Sears catheter.      8/20: Patient had no acute overnight events.  Patient VSS, HDS, and afebrile.  Patient is POD#1 s/p right PCNL.  Patient tolerated procedure and continues to do well. He is cleared for discharge to home. He will be discharge w/ Flomax,  Oxybutynin, and Pyridium, w/ 3 days of Cefdinir.     Previous urine cultures:     Kidney Stone History:  First-time stone former - No  Concurrent asymptomatic stone(s) - Yes  Previous stone surgeries - SWL about 15 years ago  Previous passed stones - No  Comorbidities - non-contributory  Family history of kidney stones - No  Previous metabolic evaluation - Several years ago (19 Aug 2024 13:02)      Vital Signs Last 24 Hrs  T(C): 36.8 (20 Aug 2024 05:05), Max: 36.8 (20 Aug 2024 05:05)  T(F): 98.2 (20 Aug 2024 05:05), Max: 98.2 (20 Aug 2024 05:05)  HR: 70 (20 Aug 2024 05:05) (56 - 97)  BP: 104/65 (20 Aug 2024 05:05) (100/64 - 153/81)  BP(mean): 78 (20 Aug 2024 05:05) (76 - 111)  RR: 17 (20 Aug 2024 05:05) (14 - 17)  SpO2: 94% (20 Aug 2024 05:05) (94% - 99%)    Parameters below as of 20 Aug 2024 05:05  Patient On (Oxygen Delivery Method): room air      I&O's Summary    19 Aug 2024 07:01  -  20 Aug 2024 07:00  --------------------------------------------------------  IN: 840 mL / OUT: 900 mL / NET: -60 mL    LABS:                        11.8   17.12 )-----------( 253      ( 20 Aug 2024 05:30 )             36.8     08-20    139  |  106  |  17  ----------------------------<  161<H>  4.1   |  24  |  1.12    Ca    8.6      20 Aug 2024 05:30  Phos  3.3     08-20  Mg     1.7     08-20 Patient is a 69y old  Male who presents with a chief complaint of R PCNL (20 Aug 2024 05:28)      HPI:  CC: kidney stones  History of Present Illness: SILVANO BARNES is a 69 year old male with a PMH of BPH, kidney stones, HLD who presents for evaluation of kidney stones. He has gotten about 1 UTI /year for several years and was treated about 4 weeks ago for an infection. On 7/2 he developed gross hematuria and was evaluated in an ER. He required a sears catheter as he was not able to empty his bladder well and urine remained persistently bloody. CT at that time redemonstrated known large right sided stone. On 7/5 the catheter stopped draining and he was reevaluated then admitted to the hospital requiring CBI. He is still unable to empty his bladder well and is requring CIC two times per day, with residuals about 500ml. He takes 0.8mg Flomax per day and was just started on finasteride. He has been aware of the stone for about 4-5 years ago, but it was not causing issues so he did not have it treated.    Imaging: CT scan from 07/03/2024 can be found in [facility] PACS. Findings: Staghorn calculus in the right renal pelvis/collecting system measuring up to 2.9 cm with associated moderate hydronephrosis. Multiple nonobstructing right renal calculi. Mild bilateral perinephric stranding. Bilateral renal cysts. Diffuse bladder wall thickening, though decompressed around a Sears catheter.      8/20: Patient had no acute overnight events.  Patient VSS, HDS, and afebrile.  Patient is POD#1 s/p right PCNL.  Patient tolerated procedure and continues to do well. He is cleared for discharge to home. He will be discharge w/ Flomax,  Oxybutynin, and Pyridium, w/ 3 days of Cefdinir. Patient to doCICself catheterization 3times a day until Bladder function returns.    Previous urine cultures:     Kidney Stone History:  First-time stone former - No  Concurrent asymptomatic stone(s) - Yes  Previous stone surgeries - SWL about 15 years ago  Previous passed stones - No  Comorbidities - non-contributory  Family history of kidney stones - No  Previous metabolic evaluation - Several years ago (19 Aug 2024 13:02)      Vital Signs Last 24 Hrs  T(C): 36.8 (20 Aug 2024 05:05), Max: 36.8 (20 Aug 2024 05:05)  T(F): 98.2 (20 Aug 2024 05:05), Max: 98.2 (20 Aug 2024 05:05)  HR: 70 (20 Aug 2024 05:05) (56 - 97)  BP: 104/65 (20 Aug 2024 05:05) (100/64 - 153/81)  BP(mean): 78 (20 Aug 2024 05:05) (76 - 111)  RR: 17 (20 Aug 2024 05:05) (14 - 17)  SpO2: 94% (20 Aug 2024 05:05) (94% - 99%)    Parameters below as of 20 Aug 2024 05:05  Patient On (Oxygen Delivery Method): room air      I&O's Summary    19 Aug 2024 07:01  -  20 Aug 2024 07:00  --------------------------------------------------------  IN: 840 mL / OUT: 900 mL / NET: -60 mL    LABS:                        11.8   17.12 )-----------( 253      ( 20 Aug 2024 05:30 )             36.8     08-20    139  |  106  |  17  ----------------------------<  161<H>  4.1   |  24  |  1.12    Ca    8.6      20 Aug 2024 05:30  Phos  3.3     08-20  Mg     1.7     08-20

## 2024-08-20 NOTE — PROGRESS NOTE ADULT - ASSESSMENT
69M R renal staghorn calculus s/p R PCNL 8/19    Plan:  -monitor I's and O's  -SCD's  -IS  -pain and nausea control  -OOB  -TOV in AM  -f/u am labs
68 yo male s/p R PCNL, POD #1

## 2024-08-21 PROBLEM — N39.0 URINARY TRACT INFECTION, SITE NOT SPECIFIED: Chronic | Status: ACTIVE | Noted: 2024-08-16

## 2024-08-21 PROBLEM — E78.5 HYPERLIPIDEMIA, UNSPECIFIED: Chronic | Status: ACTIVE | Noted: 2024-08-19

## 2024-08-21 PROBLEM — N20.0 CALCULUS OF KIDNEY: Chronic | Status: ACTIVE | Noted: 2024-08-16

## 2024-08-21 LAB
CULTURE RESULTS: NO GROWTH — SIGNIFICANT CHANGE UP
CULTURE RESULTS: SIGNIFICANT CHANGE UP
SPECIMEN SOURCE: SIGNIFICANT CHANGE UP
SPECIMEN SOURCE: SIGNIFICANT CHANGE UP

## 2024-08-22 LAB — SURGICAL PATHOLOGY STUDY: SIGNIFICANT CHANGE UP

## 2024-08-23 ENCOUNTER — APPOINTMENT (OUTPATIENT)
Dept: UROLOGY | Facility: CLINIC | Age: 69
End: 2024-08-23
Payer: COMMERCIAL

## 2024-08-23 VITALS
DIASTOLIC BLOOD PRESSURE: 94 MMHG | HEART RATE: 94 BPM | WEIGHT: 185 LBS | OXYGEN SATURATION: 98 % | BODY MASS INDEX: 30.82 KG/M2 | SYSTOLIC BLOOD PRESSURE: 152 MMHG | TEMPERATURE: 98 F | HEIGHT: 65 IN

## 2024-08-23 DIAGNOSIS — R21 RASH AND OTHER NONSPECIFIC SKIN ERUPTION: ICD-10-CM

## 2024-08-23 PROCEDURE — 52310 CYSTOSCOPY AND TREATMENT: CPT | Mod: 58

## 2024-08-23 RX ORDER — CLINDAMYCIN HYDROCHLORIDE 300 MG/1
300 CAPSULE ORAL EVERY 8 HOURS
Qty: 9 | Refills: 0 | Status: ACTIVE | COMMUNITY
Start: 2024-08-23 | End: 1900-01-01

## 2024-08-23 NOTE — HISTORY OF PRESENT ILLNESS
[FreeTextEntry1] : Name SILVANO BARNES MRN [id number]  May 29 1955 ------------------------------------------------------------------------------------------------------------------------------------------- Date of First Visit: 2024 Referring Provider/PCP: Dr. Yuliet Rubi ------------------------------------------------------------------------------------------------------------------------------------------- CC: kidney stones  History of Present Illness: SILVANO BARNES is a 69-year-old male with a PMH of BPH, kidney stones, HLD who presents for evaluation of kidney stones. He has gotten about 1 UTI /year for several years and was treated about 4 weeks ago for an infection. On  he developed gross hematuria and was evaluated in an ER. He required a sears catheter as he was not able to empty his bladder well and urine remained persistently bloody. CT at that time redemonstrated known large right sided stone. On  the catheter stopped draining and he was reevaluated then admitted to the hospital requiring CBI. He is still unable to empty his bladder well and is requiring CIC two times per day, with residuals about 500ml. He takes 0.8mg Flomax per day and was just started on finasteride. He has been aware of the stone for about 4-5 years ago, but it was not causing issues, so he did not have it treated.  Imaging: CT scan from 2024 can be found in [facility] PACS. Findings: Staghorn calculus in the right renal pelvis/collecting system measuring up to 2.9 cm with associated moderate hydronephrosis. Multiple nonobstructing right renal calculi. Mild bilateral perinephric stranding. Bilateral renal cysts. Diffuse bladder wall thickening, though decompressed around a Sears catheter.  Previous urine cultures: n/a  Kidney Stone History: First-time stone former - No Concurrent asymptomatic stone(s) - Yes Previous stone surgeries - SWL about 15 years ago Previous passed stones - No Comorbidities - non-contributory Family history of kidney stones - No Previous metabolic evaluation - Several years ago -------------------------------------------------------------------------------------------------------------------------------------------  Interval History (2024): SILVANO BARNES presents s/p right supine PCNL on . He tolerated the procedure well and was discharged home on the next day. Did not have postoperative CT. Noted to have numerous lower pole parenchymal calcifications in the lower pole at time of surgery.     Procedure: Stent was removed cystoscopically using sterile technique. Stent was intact. Patient tolerated the procedure well.

## 2024-08-23 NOTE — ASSESSMENT
[FreeTextEntry1] : Assessment:   SILVANO BARNES is a 69 year M who presents s/p right supine PCNL on 8/19.      - We reviewed common symptoms after stent removal and advised that they should resolve within the next couple of days. Patient knows to contact the office if they experience any fevers (temp >100.4) or any other concerns.      - We discussed generalized stone prevention strategies, metabolic evaluation (serum chemistry profile and 24-hour urine collection +/- PTH testing if serum Ca is elevated), and the natural history of kidney stone disease. I explained that first-time stone formers generally do not need a complete metabolic work-up in the absence of risk factors. However, without behavioral and dietary modification, they are at a heightened risk of developing future symptomatic stones:  10% at 2 years, 20% at 5 years, and 30% at 10 years ([facility] data). In recurrent stone formers, the risk of recurrence is considerably higher with a lifetime recurrence rate of 60-80%. Risk factors include stone type, total stone volume, family history, multiple stones, and many medical comorbidities (DM, HTN, HLD, obesity, gout, HPT).    Generalized stone prevention counseling was provided as follows:     1. High fluid intake. The goal should be to drink enough to produce 2.5 liters (quarts) of urine daily. Most studies have shown that high fluid volume intake will decrease the risk of stone formation. Research generally indicates that there appears to be little difference between hard and soft water in the formation of kidney stones. Lemon juice added to the water may also aid with increasing urine pH, urine citric acid and reducing stone formation.      2. Dietary recommendations. The key to all dietary recommendations is moderation.      Decrease animal protein consumption. High protein intake increases urinary calcium, oxalate, and uric acid excretion into the urine - all of which will increase the probability of stone formation.   Decrease sodium intake by avoiding heavily salted foods, and resist adding salt to food at the table. Sodium restriction is widely recognized as an important element of dietary prevention of recurrent kidney stones.    Dietary calcium.  Patient should consume a daily recommended allowance of dietary calcium (800-1200 mg). Patients who take in too much Ca or too little Ca are at an increased risk of recurrent stone formation. Dietary calcium is preferable to supplements. Calcium supplementation can be safe when the calcium is taken with meals, rather than at bedtime. Another suggestion for patients who have been recommended to take calcium supplements for their bone health is to consider using calcium citrate, an over-the-counter preparation that provides 950 mg of calcium citrate and 200 mg of elemental calcium in each tablet.   Avoid excess intake of foods with high oxalate content, including dark leafy greens, beets, nuts, tea, coffee, and chocolate. Strict avoidance of oxalate is not necessary in most cases.   Avoid high doses of vitamin C. Intake should be limited to a maximum daily dose of less than 2 gm (2,000 mg).       Plan:  -Follow up in 1 month with renal US in office  -hospitals

## 2024-08-26 ENCOUNTER — NON-APPOINTMENT (OUTPATIENT)
Age: 69
End: 2024-08-26

## 2024-08-27 ENCOUNTER — NON-APPOINTMENT (OUTPATIENT)
Age: 69
End: 2024-08-27

## 2024-08-29 LAB
CELL MATERIAL STONE EST-MCNT: SIGNIFICANT CHANGE UP
LABORATORY COMMENT REPORT: SIGNIFICANT CHANGE UP
NIDUS STONE QN: SIGNIFICANT CHANGE UP

## 2024-08-30 DIAGNOSIS — N40.1 BENIGN PROSTATIC HYPERPLASIA WITH LOWER URINARY TRACT SYMPTOMS: ICD-10-CM

## 2024-08-30 DIAGNOSIS — N20.0 CALCULUS OF KIDNEY: ICD-10-CM

## 2024-08-30 DIAGNOSIS — N13.2 HYDRONEPHROSIS WITH RENAL AND URETERAL CALCULOUS OBSTRUCTION: ICD-10-CM

## 2024-08-30 DIAGNOSIS — E78.5 HYPERLIPIDEMIA, UNSPECIFIED: ICD-10-CM

## 2024-08-30 DIAGNOSIS — N28.1 CYST OF KIDNEY, ACQUIRED: ICD-10-CM

## 2024-09-10 ENCOUNTER — APPOINTMENT (OUTPATIENT)
Dept: UROLOGY | Facility: CLINIC | Age: 69
End: 2024-09-10
Payer: COMMERCIAL

## 2024-09-10 VITALS — HEART RATE: 70 BPM | SYSTOLIC BLOOD PRESSURE: 138 MMHG | DIASTOLIC BLOOD PRESSURE: 84 MMHG | OXYGEN SATURATION: 100 %

## 2024-09-10 DIAGNOSIS — R31.29 OTHER MICROSCOPIC HEMATURIA: ICD-10-CM

## 2024-09-10 DIAGNOSIS — N20.0 CALCULUS OF KIDNEY: ICD-10-CM

## 2024-09-10 DIAGNOSIS — R33.9 RETENTION OF URINE, UNSPECIFIED: ICD-10-CM

## 2024-09-10 PROCEDURE — 99214 OFFICE O/P EST MOD 30 MIN: CPT

## 2024-09-11 PROBLEM — R33.9 URINARY RETENTION: Status: ACTIVE | Noted: 2024-09-11

## 2024-09-11 NOTE — ASSESSMENT
[FreeTextEntry1] : Patient is a 69-year-old male with intermittent gross hematuria, urinary retention, recent UTI and staghorn renal calculus.  He underwent a right PCNL at an outside institution about 2 weeks ago.  He feels like he is healing he still fatigued and sore on the right side.  He denies any interval UTI or gross hematuria.  He is doing intermittent catheterization for persistent urinary retention and voiding small amounts on his own.. He does have malodorous urine.  Assessment and plan 1. Staghorn right renal calculus status post R PCNL 2 weeks ago healing 2. Gross hematuria-resolved 3. BPH with obstruction/urinary retention currently on tamsulosin to 0.8 mg a day and finasteride 5 mg p.o. daily. Continue CIC  4.  Malodorous urine  I will call in a week of Omnicef given his recent surgery and he will follow-up in 2 weeks with a telephone visit.

## 2024-09-17 ENCOUNTER — APPOINTMENT (OUTPATIENT)
Dept: UROLOGY | Facility: CLINIC | Age: 69
End: 2024-09-17

## 2024-09-17 DIAGNOSIS — R97.20 ELEVATED PROSTATE, SPECIFIC ANTIGEN [PSA]: ICD-10-CM

## 2024-09-17 DIAGNOSIS — Z87.442 PERSONAL HISTORY OF URINARY CALCULI: ICD-10-CM

## 2024-09-17 DIAGNOSIS — N40.0 BENIGN PROSTATIC HYPERPLASIA WITHOUT LOWER URINARY TRACT SYMPMS: ICD-10-CM

## 2024-09-17 PROCEDURE — 99024 POSTOP FOLLOW-UP VISIT: CPT

## 2024-09-27 ENCOUNTER — APPOINTMENT (OUTPATIENT)
Dept: UROLOGY | Facility: CLINIC | Age: 69
End: 2024-09-27

## 2024-09-27 PROCEDURE — 76770 US EXAM ABDO BACK WALL COMP: CPT

## 2024-09-27 PROCEDURE — 99204 OFFICE O/P NEW MOD 45 MIN: CPT

## 2024-09-27 PROCEDURE — 99213 OFFICE O/P EST LOW 20 MIN: CPT | Mod: 24

## 2024-10-01 NOTE — HISTORY OF PRESENT ILLNESS
[FreeTextEntry1] : Name SILVANO BARNES MRN [id number]  May 29 1955 ------------------------------------------------------------------------------------------------------------------------------------------- Date of First Visit: 2024 Referring Provider/PCP: Dr. Yuliet Rubi ------------------------------------------------------------------------------------------------------------------------------------------- CC: kidney stones  History of Present Illness: SILVANO BARNES is a 69-year-old male with a PMH of BPH, kidney stones, HLD who presents for evaluation of kidney stones. He has gotten about 1 UTI /year for several years and was treated about 4 weeks ago for an infection. On  he developed gross hematuria and was evaluated in an ER. He required a sears catheter as he was not able to empty his bladder well and urine remained persistently bloody. CT at that time redemonstrated known large right sided stone. On  the catheter stopped draining and he was reevaluated then admitted to the hospital requiring CBI. He is still unable to empty his bladder well and is requiring CIC two times per day, with residuals about 500ml. He takes 0.8mg Flomax per day and was just started on finasteride. He has been aware of the stone for about 4-5 years ago, but it was not causing issues, so he did not have it treated.  Imaging: CT scan from 2024 can be found in [facility] PACS. Findings: Staghorn calculus in the right renal pelvis/collecting system measuring up to 2.9 cm with associated moderate hydronephrosis. Multiple nonobstructing right renal calculi. Mild bilateral perinephric stranding. Bilateral renal cysts. Diffuse bladder wall thickening, though decompressed around a Sears catheter.  Previous urine cultures: n/a  Kidney Stone History: First-time stone former - No Concurrent asymptomatic stone(s) - Yes Previous stone surgeries - SWL about 15 years ago Previous passed stones - No Comorbidities - non-contributory Family history of kidney stones - No Previous metabolic evaluation - Several years ago ------------------------------------------------------------------------------------------------------------------------------------------- Interval History (2024): SILVANO BARNES presents s/p right supine PCNL on . He tolerated the procedure well and was discharged home on the next day. Did not have postoperative CT. Noted to have numerous lower pole parenchymal calcifications in the lower pole at time of surgery.  Procedure: Stent was removed cystoscopically using sterile technique. Stent was intact. Patient tolerated the procedure well. ------------------------------------------------------------------------------------------------------------------------------------------- Interval History (2024): Patient presents for 1-month follow up and renal ultrasound after right supine PCNL. Doing well overall but states he still has ongoing fatigue and has been dealing with recurrent UTI. Currently on abx. Continues to perform CIC BID - no known plans for management of YEUNG.  Ultrasound performed in the office today demonstrates mild fullness of the right renal pelvis without dilation of the right ureter, and right ureteral jet is visualized. There is a parenchymal echogenic focus in the right lower pole measuring 3.5 mm with twinkle artifact. Small left simple cyst.   Stone composition: 100% uric acid Stone culture: negative Yet to complete 24-hour urine urinalysis

## 2024-10-01 NOTE — ASSESSMENT
[FreeTextEntry1] : Assessment: SILVANO BARNES is a 69-year-old M with uric acid nephrolithiasis s/p right PCNL. Stone-free on US. Will initiate alkalinization therapy for stone prevention. Defer to his primary urologist on management of YEUNG/need for CIC.       Plan: -K citrate 15 mEq BID -BMP in 1 week -TEB 1-2 weeks to review log of random daily urine pH levels -Continue with generalized stone prevention strategies as previously discussed (increase fluid intake to keep urine clear or very faint yellow, limit dietary salt intake, increase fruits and vegetables, limit high oxalate foods, maintain normal dietary calcium, and moderation of non-dairy animal protein) -[employer]

## 2024-10-01 NOTE — HISTORY OF PRESENT ILLNESS
[FreeTextEntry1] : Name SILVANO BARNES MRN [id number]  May 29 1955 ------------------------------------------------------------------------------------------------------------------------------------------- Date of First Visit: 2024 Referring Provider/PCP: Dr. Yuliet Rubi ------------------------------------------------------------------------------------------------------------------------------------------- CC: kidney stones  History of Present Illness: SILVANO BARNES is a 69-year-old male with a PMH of BPH, kidney stones, HLD who presents for evaluation of kidney stones. He has gotten about 1 UTI /year for several years and was treated about 4 weeks ago for an infection. On  he developed gross hematuria and was evaluated in an ER. He required a sears catheter as he was not able to empty his bladder well and urine remained persistently bloody. CT at that time redemonstrated known large right sided stone. On  the catheter stopped draining and he was reevaluated then admitted to the hospital requiring CBI. He is still unable to empty his bladder well and is requiring CIC two times per day, with residuals about 500ml. He takes 0.8mg Flomax per day and was just started on finasteride. He has been aware of the stone for about 4-5 years ago, but it was not causing issues, so he did not have it treated.  Imaging: CT scan from 2024 can be found in [facility] PACS. Findings: Staghorn calculus in the right renal pelvis/collecting system measuring up to 2.9 cm with associated moderate hydronephrosis. Multiple nonobstructing right renal calculi. Mild bilateral perinephric stranding. Bilateral renal cysts. Diffuse bladder wall thickening, though decompressed around a Sears catheter.  Previous urine cultures: n/a  Kidney Stone History: First-time stone former - No Concurrent asymptomatic stone(s) - Yes Previous stone surgeries - SWL about 15 years ago Previous passed stones - No Comorbidities - non-contributory Family history of kidney stones - No Previous metabolic evaluation - Several years ago ------------------------------------------------------------------------------------------------------------------------------------------- Interval History (2024): SILVANO BARNES presents s/p right supine PCNL on . He tolerated the procedure well and was discharged home on the next day. Did not have postoperative CT. Noted to have numerous lower pole parenchymal calcifications in the lower pole at time of surgery.  Procedure: Stent was removed cystoscopically using sterile technique. Stent was intact. Patient tolerated the procedure well. ------------------------------------------------------------------------------------------------------------------------------------------- Interval History (2024): Patient presents for 1-month follow up and renal ultrasound after right supine PCNL. Doing well overall but states he still has ongoing fatigue and has been dealing with recurrent UTI. Currently on abx. Continues to perform CIC BID - no known plans for management of YEUNG.  Ultrasound performed in the office today demonstrates mild fullness of the right renal pelvis without dilation of the right ureter, and right ureteral jet is visualized. There is a parenchymal echogenic focus in the right lower pole measuring 3.5 mm with twinkle artifact. Small left simple cyst.   Stone composition: 100% uric acid Stone culture: negative Yet to complete 24-hour urine urinalysis

## 2024-10-10 ENCOUNTER — APPOINTMENT (OUTPATIENT)
Dept: UROLOGY | Facility: CLINIC | Age: 69
End: 2024-10-10

## 2024-10-14 LAB — BACTERIA UR CULT: ABNORMAL

## 2024-10-14 RX ORDER — SULFAMETHOXAZOLE AND TRIMETHOPRIM 800; 160 MG/1; MG/1
800-160 TABLET ORAL TWICE DAILY
Qty: 20 | Refills: 0 | Status: ACTIVE | COMMUNITY
Start: 2024-10-14 | End: 1900-01-01

## 2024-10-22 ENCOUNTER — APPOINTMENT (OUTPATIENT)
Dept: UROLOGY | Facility: CLINIC | Age: 69
End: 2024-10-22

## 2024-10-29 ENCOUNTER — APPOINTMENT (OUTPATIENT)
Dept: UROLOGY | Facility: CLINIC | Age: 69
End: 2024-10-29

## 2024-10-29 VITALS — OXYGEN SATURATION: 100 % | HEART RATE: 98 BPM | DIASTOLIC BLOOD PRESSURE: 88 MMHG | SYSTOLIC BLOOD PRESSURE: 129 MMHG

## 2024-10-29 DIAGNOSIS — Z87.442 PERSONAL HISTORY OF URINARY CALCULI: ICD-10-CM

## 2024-10-29 DIAGNOSIS — N40.0 BENIGN PROSTATIC HYPERPLASIA WITHOUT LOWER URINARY TRACT SYMPMS: ICD-10-CM

## 2024-10-29 PROCEDURE — 99213 OFFICE O/P EST LOW 20 MIN: CPT | Mod: 24

## 2024-11-01 ENCOUNTER — TRANSCRIPTION ENCOUNTER (OUTPATIENT)
Age: 69
End: 2024-11-01

## 2024-11-15 ENCOUNTER — APPOINTMENT (OUTPATIENT)
Dept: UROLOGY | Facility: CLINIC | Age: 69
End: 2024-11-15
Payer: COMMERCIAL

## 2024-11-15 PROCEDURE — G2211 COMPLEX E/M VISIT ADD ON: CPT | Mod: NC

## 2024-11-15 PROCEDURE — 99024 POSTOP FOLLOW-UP VISIT: CPT

## 2024-11-18 RX ORDER — POTASSIUM CITRATE 15 MEQ/1
15 MEQ TABLET, EXTENDED RELEASE ORAL
Qty: 60 | Refills: 5 | Status: ACTIVE | COMMUNITY
Start: 2024-11-18 | End: 1900-01-01

## 2025-03-04 ENCOUNTER — APPOINTMENT (OUTPATIENT)
Dept: UROLOGY | Facility: CLINIC | Age: 70
End: 2025-03-04
Payer: COMMERCIAL

## 2025-03-04 VITALS
DIASTOLIC BLOOD PRESSURE: 93 MMHG | HEIGHT: 65 IN | WEIGHT: 177 LBS | BODY MASS INDEX: 29.49 KG/M2 | SYSTOLIC BLOOD PRESSURE: 150 MMHG

## 2025-03-04 DIAGNOSIS — N39.0 URINARY TRACT INFECTION, SITE NOT SPECIFIED: ICD-10-CM

## 2025-03-04 DIAGNOSIS — R97.20 ELEVATED PROSTATE, SPECIFIC ANTIGEN [PSA]: ICD-10-CM

## 2025-03-04 DIAGNOSIS — N40.0 BENIGN PROSTATIC HYPERPLASIA WITHOUT LOWER URINARY TRACT SYMPMS: ICD-10-CM

## 2025-03-04 DIAGNOSIS — Z87.442 PERSONAL HISTORY OF URINARY CALCULI: ICD-10-CM

## 2025-03-04 PROCEDURE — 51798 US URINE CAPACITY MEASURE: CPT

## 2025-03-04 PROCEDURE — 99214 OFFICE O/P EST MOD 30 MIN: CPT | Mod: 25

## 2025-05-01 RX ORDER — POTASSIUM CITRATE 15 MEQ/1
15 MEQ TABLET, EXTENDED RELEASE ORAL
Qty: 180 | Refills: 3 | Status: ACTIVE | COMMUNITY
Start: 2025-05-01 | End: 1900-01-01

## 2025-05-15 ENCOUNTER — NON-APPOINTMENT (OUTPATIENT)
Age: 70
End: 2025-05-15

## 2025-06-11 ENCOUNTER — APPOINTMENT (OUTPATIENT)
Dept: UROLOGY | Facility: CLINIC | Age: 70
End: 2025-06-11

## (undated) DEVICE — BOSTON SCIENTIFC PUMPING SYSTEM SAPS SINGLE ACTION 10CC

## (undated) DEVICE — PACK CYSTO

## (undated) DEVICE — BOSTON SCIENTIFC GATEWAY ADVANTAGE Y-ADAPTER

## (undated) DEVICE — TUBING TUR 2 PRONG

## (undated) DEVICE — BOSTON SCIENTIFIC UROLOK II SCOPE ADAPTOR

## (undated) DEVICE — FOLEY TRAY 16FR 5CC LF UMETER CLOSED

## (undated) DEVICE — SUT SILK 0 30" PSL

## (undated) DEVICE — TUBING SUCTION NONCONDUCTIVE 6MM X 12FT

## (undated) DEVICE — DRAINAGE BAG URINARY 2L

## (undated) DEVICE — NDL ACCESS NAVIGUIDE 18G 20CM

## (undated) DEVICE — DRAPE NEUROLOGICAL

## (undated) DEVICE — NDL NEPH FASCIAL INCISING 18G 5CM 4.5MM

## (undated) DEVICE — SUT CHROMIC 4-0 27" SH

## (undated) DEVICE — BLADE SURGICAL #11 CARBON

## (undated) DEVICE — TUBING RANGER FLUID IRRIGATION SET DISP

## (undated) DEVICE — AMPLATZ RENAL DILATOR 8FR-30FR X 35CM

## (undated) DEVICE — DRAPE C ARM 41X74"

## (undated) DEVICE — PACK GENERAL MINOR

## (undated) DEVICE — SUT CHROMIC 3-0 27" SH

## (undated) DEVICE — PREP BETADINE KIT

## (undated) DEVICE — GLV 7.5 PROTEXIS (WHITE)

## (undated) DEVICE — BOSTON SCIENTIFC ENCORE 26 INFLATOR

## (undated) DEVICE — SOL IRR BAG NS 0.9% 3000ML